# Patient Record
Sex: FEMALE | Race: WHITE | Employment: FULL TIME | URBAN - METROPOLITAN AREA
[De-identification: names, ages, dates, MRNs, and addresses within clinical notes are randomized per-mention and may not be internally consistent; named-entity substitution may affect disease eponyms.]

---

## 2017-01-06 PROBLEM — E55.9 VITAMIN D DEFICIENCY: Status: ACTIVE | Noted: 2017-01-06

## 2017-01-06 PROCEDURE — 80061 LIPID PANEL: CPT | Performed by: FAMILY MEDICINE

## 2017-01-06 PROCEDURE — 85025 COMPLETE CBC W/AUTO DIFF WBC: CPT | Performed by: FAMILY MEDICINE

## 2017-01-06 PROCEDURE — 82306 VITAMIN D 25 HYDROXY: CPT | Performed by: FAMILY MEDICINE

## 2017-01-06 PROCEDURE — 80053 COMPREHEN METABOLIC PANEL: CPT | Performed by: FAMILY MEDICINE

## 2017-01-06 NOTE — PROGRESS NOTES
Razia Connolly is a 39year old female. Patient presents with:  Medication Follow-Up: per pt, following up on medication      HPI:   Better. Sadness has improved. On 40 mg of fluoxetine.  Around the time of her period, she feels worse, but even that has got apparent distress  SKIN: no rashes,no suspicious lesions   HEENT: atraumatic, normocephalic,ears and throat are clear  NECK: supple,no adenopathy   LUNGS: clear to auscultation  CARDIO: RRR without murmur  GI: good BS's,no masses, HSM or tenderness   EXTRE

## 2017-07-18 DIAGNOSIS — F32.89 OTHER DEPRESSION: ICD-10-CM

## 2017-07-18 RX ORDER — FLUOXETINE HYDROCHLORIDE 40 MG/1
CAPSULE ORAL
Qty: 30 CAPSULE | Refills: 2 | Status: SHIPPED | OUTPATIENT
Start: 2017-07-18 | End: 2017-10-15

## 2017-10-02 ENCOUNTER — TELEPHONE (OUTPATIENT)
Dept: OBGYN CLINIC | Facility: CLINIC | Age: 42
End: 2017-10-02

## 2017-10-02 DIAGNOSIS — Z12.31 VISIT FOR SCREENING MAMMOGRAM: Primary | ICD-10-CM

## 2017-10-15 DIAGNOSIS — F32.89 OTHER DEPRESSION: ICD-10-CM

## 2017-10-16 RX ORDER — FLUOXETINE HYDROCHLORIDE 40 MG/1
CAPSULE ORAL
Qty: 30 CAPSULE | Refills: 0 | Status: SHIPPED | OUTPATIENT
Start: 2017-10-16 | End: 2017-11-09

## 2017-10-16 NOTE — TELEPHONE ENCOUNTER
Patient notified via detailed voicemail left at cell number (ok per  HIPAA consent)  Advised patient to call back and schedule an appt.

## 2017-10-16 NOTE — TELEPHONE ENCOUNTER
I had wanted her to follow up in 6 months to make sure fluoxetine is still working well. Can you have her schedule, please so we can check in? Will send in 1 month worth. Thanks.

## 2017-11-09 ENCOUNTER — OFFICE VISIT (OUTPATIENT)
Dept: FAMILY MEDICINE CLINIC | Facility: CLINIC | Age: 42
End: 2017-11-09

## 2017-11-09 VITALS
DIASTOLIC BLOOD PRESSURE: 76 MMHG | WEIGHT: 136 LBS | TEMPERATURE: 98 F | HEIGHT: 66 IN | SYSTOLIC BLOOD PRESSURE: 118 MMHG | RESPIRATION RATE: 14 BRPM | OXYGEN SATURATION: 99 % | BODY MASS INDEX: 21.86 KG/M2 | HEART RATE: 80 BPM

## 2017-11-09 DIAGNOSIS — Z23 NEED FOR VACCINATION: Primary | ICD-10-CM

## 2017-11-09 DIAGNOSIS — F32.89 OTHER DEPRESSION: ICD-10-CM

## 2017-11-09 PROCEDURE — 99213 OFFICE O/P EST LOW 20 MIN: CPT | Performed by: FAMILY MEDICINE

## 2017-11-09 PROCEDURE — 90471 IMMUNIZATION ADMIN: CPT | Performed by: FAMILY MEDICINE

## 2017-11-09 PROCEDURE — 90686 IIV4 VACC NO PRSV 0.5 ML IM: CPT | Performed by: FAMILY MEDICINE

## 2017-11-09 RX ORDER — FLUOXETINE HYDROCHLORIDE 40 MG/1
40 CAPSULE ORAL
Qty: 30 CAPSULE | Refills: 5 | Status: SHIPPED | OUTPATIENT
Start: 2017-11-09 | End: 2018-05-13

## 2017-11-09 NOTE — PROGRESS NOTES
Jenita Alpers is a 39year old female. Patient presents with: Follow - Up: medication check-med refill-Prozac      HPI:   Feeling well. No new concerns. Doing well with fluoxetine. No side effects. Not feeling down or depressed. No anxiety.  Busy with her atraumatic, normocephalic,ears and throat are clear  NECK: supple,no adenopathy   LUNGS: clear to auscultation  CARDIO: RRR without murmur  GI: good BS's,no masses, HSM or tenderness  EXTREMITIES: no cyanosis, clubbing or edema    ASSESSMENT AND PLAN:

## 2017-11-17 ENCOUNTER — HOSPITAL ENCOUNTER (OUTPATIENT)
Dept: MAMMOGRAPHY | Age: 42
Discharge: HOME | End: 2017-11-17
Attending: OBSTETRICS & GYNECOLOGY
Payer: COMMERCIAL

## 2017-11-17 DIAGNOSIS — Z12.31 VISIT FOR SCREENING MAMMOGRAM: ICD-10-CM

## 2017-11-17 PROCEDURE — 77067 SCR MAMMO BI INCL CAD: CPT | Performed by: OBSTETRICS & GYNECOLOGY

## 2017-11-27 ENCOUNTER — HOSPITAL ENCOUNTER (OUTPATIENT)
Dept: MAMMOGRAPHY | Age: 42
Discharge: HOME | End: 2017-11-27
Attending: OBSTETRICS & GYNECOLOGY
Payer: COMMERCIAL

## 2017-11-27 DIAGNOSIS — R92.2 INCONCLUSIVE MAMMOGRAM: ICD-10-CM

## 2017-11-27 PROCEDURE — 77065 DX MAMMO INCL CAD UNI: CPT | Performed by: OBSTETRICS & GYNECOLOGY

## 2017-11-27 PROCEDURE — 77061 BREAST TOMOSYNTHESIS UNI: CPT | Performed by: OBSTETRICS & GYNECOLOGY

## 2017-12-26 ENCOUNTER — TELEPHONE (OUTPATIENT)
Dept: FAMILY MEDICINE CLINIC | Facility: CLINIC | Age: 42
End: 2017-12-26

## 2017-12-26 ENCOUNTER — OFFICE VISIT (OUTPATIENT)
Dept: FAMILY MEDICINE CLINIC | Facility: CLINIC | Age: 42
End: 2017-12-26

## 2017-12-26 VITALS
OXYGEN SATURATION: 100 % | RESPIRATION RATE: 14 BRPM | WEIGHT: 134.19 LBS | BODY MASS INDEX: 22 KG/M2 | SYSTOLIC BLOOD PRESSURE: 116 MMHG | DIASTOLIC BLOOD PRESSURE: 70 MMHG | TEMPERATURE: 98 F | HEART RATE: 106 BPM

## 2017-12-26 DIAGNOSIS — J40 BRONCHITIS: Primary | ICD-10-CM

## 2017-12-26 PROCEDURE — 99214 OFFICE O/P EST MOD 30 MIN: CPT | Performed by: FAMILY MEDICINE

## 2017-12-26 RX ORDER — AMOXICILLIN AND CLAVULANATE POTASSIUM 875; 125 MG/1; MG/1
1 TABLET, FILM COATED ORAL 2 TIMES DAILY
Qty: 20 TABLET | Refills: 0 | Status: SHIPPED | OUTPATIENT
Start: 2017-12-26 | End: 2018-01-05

## 2017-12-26 NOTE — TELEPHONE ENCOUNTER
PT CALLED AND ADV THAT SHE HAS A COUGH AND A HARD TIME BREATHING--PT FEELS LIKE SHE MAY HAVE    BRONCHITIS    PT WOULD LIKE TO BE SEEN TODAY IF POSS OR RECOMMENDATIONS    PHARMACY SHANNA Bliss

## 2017-12-26 NOTE — PROGRESS NOTES
Mj Razo is a 43year old female. Patient presents with:  Cough: x5 days    HPI:   Adama Buenrostro presents to the office with complaints of upper respiratory tract infection, having congestion for 5 days.   She has had a cough and yellow and mucoid sputum produ 12/25/2017 (Exact Date)   SpO2 100%   Breastfeeding?  No   BMI 21.66 kg/m²     EYES: no conjunctivitis, no drainage, EOMI, PERRLA  NOSE: clear, yellow rinorrhea, nose Normal, without visible defects  THROAT: clear, mildly edematous, erythema, tonsils 2+ b/l

## 2017-12-26 NOTE — TELEPHONE ENCOUNTER
Future Appointments  Date Time Provider Nehemiah Sloan   12/26/2017 1:45 PM Javier Burroughs [facility] MIKE Spencer

## 2018-05-13 DIAGNOSIS — F32.89 OTHER DEPRESSION: ICD-10-CM

## 2018-05-14 RX ORDER — FLUOXETINE HYDROCHLORIDE 40 MG/1
CAPSULE ORAL
Qty: 30 CAPSULE | Refills: 0 | Status: SHIPPED | OUTPATIENT
Start: 2018-05-14 | End: 2018-06-13

## 2018-05-14 NOTE — TELEPHONE ENCOUNTER
Last refilled on 11/9/17 for # 30 with 5 rf. Last seen on 12/26/17. No future appointments. Thank you.

## 2018-06-13 DIAGNOSIS — F32.89 OTHER DEPRESSION: ICD-10-CM

## 2018-06-14 RX ORDER — FLUOXETINE HYDROCHLORIDE 40 MG/1
CAPSULE ORAL
Qty: 30 CAPSULE | Refills: 0 | Status: SHIPPED | OUTPATIENT
Start: 2018-06-14 | End: 2018-06-22

## 2018-06-14 NOTE — TELEPHONE ENCOUNTER
Patient notified via detailed voicemail left at cell number (ok per  HIPAA consent) that script was sent to pharmacy but she is due for an appointment before next refill. Advised to call office back to schedule.

## 2018-06-14 NOTE — TELEPHONE ENCOUNTER
Last refilled on 5/14/18 for # 30 with 0 refills  Last seen on 12/26/17  No future appointments. Thank you.

## 2018-06-14 NOTE — TELEPHONE ENCOUNTER
Script sent. I would like to follow up with her on this, since it has been 6 months since I saw her.

## 2018-06-22 NOTE — PROGRESS NOTES
Shilo Edge is a 43year old female. Patient presents with:  Medication Follow-Up: per pt  Mole Removal: talk about moles on face      HPI:   Depression is much better. She is feeling more blunted, like she can't have emotions. No anxiey. Sleeping okay. masses, HSM or tenderness  EXTREMITIES: no cyanosis, clubbing or edema    ASSESSMENT AND PLAN:     Other depression  (primary encounter diagnosis)  Multiple nevi  Skin lesion of face    Diagnoses and all orders for this visit:    Other depression  -     FL

## 2018-08-02 ENCOUNTER — MED REC SCAN ONLY (OUTPATIENT)
Dept: FAMILY MEDICINE CLINIC | Facility: CLINIC | Age: 43
End: 2018-08-02

## 2018-09-14 NOTE — PROGRESS NOTES
Do Chavez is a 43year old female. Patient presents with: Follow - Up: on medications per pt      HPI:   Still feeling a little better, but still blah, doesn't care at times. Her  points it out a times.  Doesn't call people at work back when distress  SKIN: no rashes,no suspicious lesions  HEENT: atraumatic, normocephalic   NECK: supple,no adenopathy   LUNGS: clear to auscultation  CARDIO: RRR without murmur  GI: no tenderness   EXTREMITIES: no edema    ASSESSMENT AND PLAN:     Other depressio

## 2018-11-09 ENCOUNTER — OFFICE VISIT (OUTPATIENT)
Dept: OBGYN CLINIC | Facility: CLINIC | Age: 43
End: 2018-11-09
Payer: COMMERCIAL

## 2018-11-09 VITALS
HEART RATE: 64 BPM | DIASTOLIC BLOOD PRESSURE: 60 MMHG | WEIGHT: 145 LBS | HEIGHT: 66.5 IN | SYSTOLIC BLOOD PRESSURE: 122 MMHG | BODY MASS INDEX: 23.03 KG/M2 | RESPIRATION RATE: 16 BRPM

## 2018-11-09 DIAGNOSIS — Z12.4 CERVICAL CANCER SCREENING: ICD-10-CM

## 2018-11-09 DIAGNOSIS — Z23 NEED FOR VACCINATION: ICD-10-CM

## 2018-11-09 DIAGNOSIS — Z01.419 ENCOUNTER FOR WELL WOMAN EXAM WITH ROUTINE GYNECOLOGICAL EXAM: Primary | ICD-10-CM

## 2018-11-09 PROCEDURE — 90471 IMMUNIZATION ADMIN: CPT | Performed by: OBSTETRICS & GYNECOLOGY

## 2018-11-09 PROCEDURE — 88175 CYTOPATH C/V AUTO FLUID REDO: CPT | Performed by: OBSTETRICS & GYNECOLOGY

## 2018-11-09 PROCEDURE — 90686 IIV4 VACC NO PRSV 0.5 ML IM: CPT | Performed by: OBSTETRICS & GYNECOLOGY

## 2018-11-09 PROCEDURE — 87624 HPV HI-RISK TYP POOLED RSLT: CPT | Performed by: OBSTETRICS & GYNECOLOGY

## 2018-11-09 PROCEDURE — 99396 PREV VISIT EST AGE 40-64: CPT | Performed by: OBSTETRICS & GYNECOLOGY

## 2018-11-09 NOTE — PROGRESS NOTES
Lyle Castaneda is a 43year old female  Patient's last menstrual period was 10/20/2018 (exact date).  Patient presents with:  Wellness Visit: annual  .c/o menses getting heavier, declines any treatment    OBSTETRICS HISTORY:  OB History    Para Te Concern: Not Asked        Weight Concern: Not Asked    Social History Narrative      Not on file      FAMILY HISTORY:  No family history on file.     MEDICATIONS:    Current Outpatient Medications:   •  Sertraline HCl 25 MG Oral Tab, Take 1 tablet (25 mg to motion  Uterus: normal in size, contour, position, mobility, without tenderness  Adnexa: normal without masses or tenderness  Perineum: normal  Anus: no hemorroids     Assessment & Plan:  Diagnoses and all orders for this visit:    Encounter for well woman

## 2018-11-30 ENCOUNTER — HOSPITAL ENCOUNTER (OUTPATIENT)
Dept: MAMMOGRAPHY | Age: 43
Discharge: HOME | End: 2018-11-30
Attending: FAMILY MEDICINE
Payer: COMMERCIAL

## 2018-11-30 DIAGNOSIS — Z12.31 ENCOUNTER FOR SCREENING MAMMOGRAM FOR MALIGNANT NEOPLASM OF BREAST: ICD-10-CM

## 2018-11-30 PROCEDURE — 77067 SCR MAMMO BI INCL CAD: CPT | Performed by: FAMILY MEDICINE

## 2018-11-30 PROCEDURE — 77063 BREAST TOMOSYNTHESIS BI: CPT | Performed by: FAMILY MEDICINE

## 2018-12-21 DIAGNOSIS — F32.89 OTHER DEPRESSION: ICD-10-CM

## 2018-12-21 NOTE — TELEPHONE ENCOUNTER
How is she doing with this medication? Is it helping? This is a low dose so there is room for an increase in dose if needed.

## 2018-12-21 NOTE — TELEPHONE ENCOUNTER
Last refill on Sertraline #30 with 1 refill on 10 30 2016  Last OV on 9 14 2018   No future appointments scheduled

## 2018-12-22 RX ORDER — SERTRALINE HYDROCHLORIDE 25 MG/1
TABLET, FILM COATED ORAL
Qty: 30 TABLET | Refills: 0 | Status: SHIPPED | OUTPATIENT
Start: 2018-12-22 | End: 2019-01-22

## 2018-12-26 NOTE — TELEPHONE ENCOUNTER
Patient states that she is doing \"well\" with the sertraline. Is happy with it. Advised that I would let Dr Delano August know.

## 2019-01-22 DIAGNOSIS — F32.89 OTHER DEPRESSION: ICD-10-CM

## 2019-01-22 RX ORDER — SERTRALINE HYDROCHLORIDE 25 MG/1
TABLET, FILM COATED ORAL
Qty: 90 TABLET | Refills: 1 | Status: SHIPPED | OUTPATIENT
Start: 2019-01-22 | End: 2019-07-10

## 2019-02-25 ENCOUNTER — E-VISIT (OUTPATIENT)
Dept: FAMILY MEDICINE CLINIC | Facility: CLINIC | Age: 44
End: 2019-02-25

## 2019-02-25 DIAGNOSIS — Z02.9 ENCOUNTERS FOR UNSPECIFIED ADMINISTRATIVE PURPOSE: Primary | ICD-10-CM

## 2019-02-25 PROCEDURE — 98969 ONLINE SERVICE BY HC PRO: CPT | Performed by: NURSE PRACTITIONER

## 2019-03-14 ENCOUNTER — HOSPITAL ENCOUNTER (OUTPATIENT)
Age: 44
Discharge: HOME | End: 2019-03-14
Payer: COMMERCIAL

## 2019-03-14 VITALS
HEART RATE: 78 BPM | TEMPERATURE: 98 F | SYSTOLIC BLOOD PRESSURE: 141 MMHG | BODY MASS INDEX: 22 KG/M2 | WEIGHT: 140 LBS | OXYGEN SATURATION: 98 % | RESPIRATION RATE: 16 BRPM | DIASTOLIC BLOOD PRESSURE: 62 MMHG

## 2019-03-14 DIAGNOSIS — J32.9 VIRAL SINUSITIS: Primary | ICD-10-CM

## 2019-03-14 DIAGNOSIS — B97.89 VIRAL SINUSITIS: Primary | ICD-10-CM

## 2019-03-14 PROCEDURE — 99213 OFFICE O/P EST LOW 20 MIN: CPT

## 2019-03-14 PROCEDURE — 99204 OFFICE O/P NEW MOD 45 MIN: CPT

## 2019-03-14 RX ORDER — PSEUDOEPHEDRINE HCL 120 MG/1
120 TABLET, FILM COATED, EXTENDED RELEASE ORAL EVERY 12 HOURS PRN
Qty: 10 TABLET | Refills: 0 | Status: SHIPPED | OUTPATIENT
Start: 2019-03-14 | End: 2019-04-13

## 2019-03-14 RX ORDER — METHYLPREDNISOLONE 4 MG/1
TABLET ORAL
Qty: 1 PACKAGE | Refills: 0 | Status: SHIPPED | OUTPATIENT
Start: 2019-03-14 | End: 2019-11-15 | Stop reason: ALTCHOICE

## 2019-03-14 RX ORDER — FLUTICASONE PROPIONATE 50 MCG
2 SPRAY, SUSPENSION (ML) NASAL DAILY
Qty: 16 G | Refills: 0 | Status: SHIPPED | OUTPATIENT
Start: 2019-03-14 | End: 2019-04-08

## 2019-03-14 NOTE — ED PROVIDER NOTES
Patient Seen in: [address] [facility] - [place], [place]    History   Patient presents with:  Cough/URI    Stated Complaint: Sinus Pressure (x2 days)    HPI    Patient is a pleasant 24-year-old female with a medical history of depression, previous .   Pa cervical point tenderness. No mastoid erythema or tenderness. No occiput erythema or tenderness  Eye examination: EOMs are intact, normal conjunctival  ENT: No injection noted to the bilateral auditory canals; no loss of landmarks.   Audible nasal congest 0    PSEUDOEPHEDRINE HCL  MG Oral Tablet 12 Hr  Take 1 tablet (120 mg total) by mouth every 12 (twelve) hours as needed for congestion.   Qty: 10 tablet Refills: 0

## 2019-04-08 RX ORDER — FLUTICASONE PROPIONATE 50 MCG
SPRAY, SUSPENSION (ML) NASAL
Qty: 16 G | Refills: 0 | Status: SHIPPED | OUTPATIENT
Start: 2019-04-08 | End: 2019-04-22

## 2019-04-08 NOTE — TELEPHONE ENCOUNTER
Last refilled on 3/14/19 for # 16g with 0 refills  Last OV 9/14/18  No future appointments. Thank you.

## 2019-07-10 DIAGNOSIS — F32.89 OTHER DEPRESSION: ICD-10-CM

## 2019-07-10 RX ORDER — SERTRALINE HYDROCHLORIDE 25 MG/1
TABLET, FILM COATED ORAL
Qty: 90 TABLET | Refills: 0 | Status: SHIPPED | OUTPATIENT
Start: 2019-07-10 | End: 2019-09-20

## 2019-07-10 NOTE — TELEPHONE ENCOUNTER
Last refill on Sertraline #90 with 1 refill on 1 22 2019   Last OV on 9 4 2018   No future appointments scheduled,

## 2019-08-13 ENCOUNTER — TELEPHONE (OUTPATIENT)
Dept: FAMILY MEDICINE CLINIC | Facility: CLINIC | Age: 44
End: 2019-08-13

## 2019-08-13 DIAGNOSIS — N30.00 ACUTE CYSTITIS WITHOUT HEMATURIA: Primary | ICD-10-CM

## 2019-08-13 RX ORDER — SULFAMETHOXAZOLE AND TRIMETHOPRIM 800; 160 MG/1; MG/1
1 TABLET ORAL 2 TIMES DAILY
Qty: 10 TABLET | Refills: 0 | Status: SHIPPED | OUTPATIENT
Start: 2019-08-13 | End: 2019-08-18

## 2019-08-13 NOTE — TELEPHONE ENCOUNTER
Patient states she is having pressure, burning and some bleeding with urination. States she knows this is a UTI.   Would like to know if something can be called in or needs to be seen

## 2019-09-20 DIAGNOSIS — F32.89 OTHER DEPRESSION: ICD-10-CM

## 2019-09-21 RX ORDER — SERTRALINE HYDROCHLORIDE 25 MG/1
TABLET, FILM COATED ORAL
Qty: 90 TABLET | Refills: 0 | Status: SHIPPED | OUTPATIENT
Start: 2019-09-21 | End: 2019-11-15

## 2019-09-21 NOTE — TELEPHONE ENCOUNTER
Last refill: 7/10/2019 #90 with 0 refills  Last Visit: 9/14/2018  Next Visit: No future appointments. Forward to Dr. Caroline Rogel please advise on refills. Thanks.

## 2019-11-15 ENCOUNTER — OFFICE VISIT (OUTPATIENT)
Dept: FAMILY MEDICINE CLINIC | Facility: CLINIC | Age: 44
End: 2019-11-15
Payer: COMMERCIAL

## 2019-11-15 VITALS
DIASTOLIC BLOOD PRESSURE: 80 MMHG | SYSTOLIC BLOOD PRESSURE: 114 MMHG | TEMPERATURE: 99 F | RESPIRATION RATE: 16 BRPM | BODY MASS INDEX: 20.49 KG/M2 | WEIGHT: 129 LBS | HEIGHT: 66.5 IN | HEART RATE: 60 BPM

## 2019-11-15 DIAGNOSIS — Z00.00 HEALTHY ADULT ON ROUTINE PHYSICAL EXAMINATION: Primary | ICD-10-CM

## 2019-11-15 DIAGNOSIS — F32.89 OTHER DEPRESSION: ICD-10-CM

## 2019-11-15 PROCEDURE — 85025 COMPLETE CBC W/AUTO DIFF WBC: CPT | Performed by: FAMILY MEDICINE

## 2019-11-15 PROCEDURE — 80061 LIPID PANEL: CPT | Performed by: FAMILY MEDICINE

## 2019-11-15 PROCEDURE — 99396 PREV VISIT EST AGE 40-64: CPT | Performed by: FAMILY MEDICINE

## 2019-11-15 PROCEDURE — 80053 COMPREHEN METABOLIC PANEL: CPT | Performed by: FAMILY MEDICINE

## 2019-11-15 PROCEDURE — 36415 COLL VENOUS BLD VENIPUNCTURE: CPT | Performed by: FAMILY MEDICINE

## 2019-11-15 RX ORDER — SERTRALINE HYDROCHLORIDE 25 MG/1
25 TABLET, FILM COATED ORAL DAILY
Qty: 90 TABLET | Refills: 3 | Status: SHIPPED | OUTPATIENT
Start: 2019-11-15 | End: 2020-07-09

## 2019-11-15 NOTE — PROGRESS NOTES
HPI:   Isadora Damon is a 37year old female who presents for a complete physical exam. Symptoms: following with gyne. . Patient complains of nothing new. Fasting for blood work. Depression: doing well on zoloft, would like to continue that.      Iveth Lira •      • OTHER SURGICAL HISTORY      benign tumor removed form neck in high school      No family history on file.    Social History:   Social History    Tobacco Use      Smoking status: Never Smoker      Smokeless tobacco: Never Used    Alcohol presents for a complete physical exam. Order in for mammogram. Self breast exam explained. Health maintenance, will check fasting Lipids, CMP, and CBC. Pt' s weight is Body mass index is 20.51 kg/m². , recommended low fat diet and aerobic exercise 30 minut

## 2019-11-19 DIAGNOSIS — D64.9 ANEMIA, UNSPECIFIED TYPE: Primary | ICD-10-CM

## 2019-12-13 ENCOUNTER — HOSPITAL ENCOUNTER (OUTPATIENT)
Dept: MAMMOGRAPHY | Age: 44
Discharge: HOME | End: 2019-12-13
Attending: FAMILY MEDICINE
Payer: COMMERCIAL

## 2019-12-13 DIAGNOSIS — Z12.31 ENCOUNTER FOR SCREENING MAMMOGRAM FOR MALIGNANT NEOPLASM OF BREAST: ICD-10-CM

## 2019-12-13 PROCEDURE — 77063 BREAST TOMOSYNTHESIS BI: CPT | Performed by: FAMILY MEDICINE

## 2019-12-13 PROCEDURE — 77067 SCR MAMMO BI INCL CAD: CPT | Performed by: FAMILY MEDICINE

## 2019-12-16 ENCOUNTER — HOSPITAL ENCOUNTER (OUTPATIENT)
Age: 44
Discharge: HOME | End: 2019-12-16
Payer: COMMERCIAL

## 2019-12-16 VITALS
RESPIRATION RATE: 16 BRPM | TEMPERATURE: 100 F | WEIGHT: 128 LBS | OXYGEN SATURATION: 98 % | SYSTOLIC BLOOD PRESSURE: 121 MMHG | DIASTOLIC BLOOD PRESSURE: 83 MMHG | HEART RATE: 91 BPM | BODY MASS INDEX: 20 KG/M2

## 2019-12-16 DIAGNOSIS — J02.0 STREPTOCOCCAL SORE THROAT: Primary | ICD-10-CM

## 2019-12-16 PROCEDURE — 87430 STREP A AG IA: CPT | Performed by: NURSE PRACTITIONER

## 2019-12-16 PROCEDURE — 99214 OFFICE O/P EST MOD 30 MIN: CPT

## 2019-12-16 PROCEDURE — 99213 OFFICE O/P EST LOW 20 MIN: CPT

## 2019-12-16 RX ORDER — AMOXICILLIN 875 MG/1
875 TABLET, COATED ORAL 2 TIMES DAILY
Qty: 20 TABLET | Refills: 0 | Status: SHIPPED | OUTPATIENT
Start: 2019-12-16 | End: 2019-12-26

## 2019-12-16 NOTE — ED PROVIDER NOTES
Patient Seen in: [address] [facility] [place]      History   Patient presents with:  Sore Throat    Stated Complaint: sore throat tired cough cold     17-year-old female presents today with complaints of isolated sore throat that started last night. Physical Exam  Vitals signs and nursing note reviewed. Constitutional:       Appearance: She is well-developed. HENT:      Head: Normocephalic.       Right Ear: Tympanic membrane and ear canal normal.      Left Ear: Tympanic membrane and ear can Oral Tab  Take 1 tablet (875 mg total) by mouth 2 (two) times daily for 10 days.   Qty: 20 tablet Refills: 0

## 2020-01-20 ENCOUNTER — HOSPITAL ENCOUNTER (OUTPATIENT)
Age: 45
Discharge: HOME | End: 2020-01-20
Attending: FAMILY MEDICINE
Payer: COMMERCIAL

## 2020-01-20 VITALS
SYSTOLIC BLOOD PRESSURE: 126 MMHG | HEART RATE: 70 BPM | RESPIRATION RATE: 16 BRPM | TEMPERATURE: 99 F | OXYGEN SATURATION: 99 % | DIASTOLIC BLOOD PRESSURE: 80 MMHG

## 2020-01-20 DIAGNOSIS — J02.0 STREP PHARYNGITIS: Primary | ICD-10-CM

## 2020-01-20 LAB — POCT RAPID STREP: POSITIVE

## 2020-01-20 PROCEDURE — 99213 OFFICE O/P EST LOW 20 MIN: CPT

## 2020-01-20 PROCEDURE — 87430 STREP A AG IA: CPT | Performed by: FAMILY MEDICINE

## 2020-01-20 PROCEDURE — 99214 OFFICE O/P EST MOD 30 MIN: CPT

## 2020-01-20 RX ORDER — ACETAMINOPHEN 500 MG
1000 TABLET ORAL ONCE
Status: COMPLETED | OUTPATIENT
Start: 2020-01-20 | End: 2020-01-20

## 2020-01-20 RX ORDER — IBUPROFEN 200 MG
200 TABLET ORAL EVERY 6 HOURS PRN
COMMUNITY
End: 2020-11-13 | Stop reason: ALTCHOICE

## 2020-01-20 RX ORDER — MELATONIN
1000 DAILY
COMMUNITY

## 2020-01-20 RX ORDER — AMOXICILLIN 875 MG/1
875 TABLET, COATED ORAL 2 TIMES DAILY
Qty: 20 TABLET | Refills: 0 | Status: SHIPPED | OUTPATIENT
Start: 2020-01-20 | End: 2020-01-30

## 2020-01-20 NOTE — ED PROVIDER NOTES
Patient Seen in: [address] [facility] [place]      History   Patient presents with:  Sore Throat    Stated Complaint: sorethroat x 2 days    HPI    26-year-old female presents with complaints of sore throat started yesterday.   Reports pain on swallowi motion, small anterior cervical lymphadenopathy bilaterally  Lungs clear to auscultation bilaterally  Heart S1-S2 heard no murmurs no gallops  Skin shows no rash        ED Course     Labs Reviewed   POCT RAPID STREP - Abnormal; Notable for the following co

## 2020-01-20 NOTE — ED INITIAL ASSESSMENT (HPI)
Pt sts sore throat began yesterday. Denies cough/cold symptoms, HA, fever. Sts had strep last month and symptoms had completely resolved. Children have had strep during the last month.

## 2020-01-31 ENCOUNTER — HOSPITAL ENCOUNTER (OUTPATIENT)
Dept: CT IMAGING | Age: 45
Discharge: HOME | End: 2020-01-31
Attending: FAMILY MEDICINE

## 2020-01-31 DIAGNOSIS — Z13.9 ENCOUNTER FOR SCREENING: ICD-10-CM

## 2020-02-20 ENCOUNTER — TELEPHONE (OUTPATIENT)
Dept: FAMILY MEDICINE CLINIC | Facility: CLINIC | Age: 45
End: 2020-02-20

## 2020-02-20 NOTE — TELEPHONE ENCOUNTER
Letter mailed to patient reminding her she is due for labs.     Lab Frequency Next Occurrence   CBC WITH DIFFERENTIAL WITH PLATELET Once 34/57/0051   FERRITIN Once 02/19/2020

## 2020-06-04 ENCOUNTER — E-VISIT (OUTPATIENT)
Dept: FAMILY MEDICINE CLINIC | Facility: CLINIC | Age: 45
End: 2020-06-04

## 2020-06-04 DIAGNOSIS — R30.0 DYSURIA: Primary | ICD-10-CM

## 2020-06-04 PROCEDURE — 99421 OL DIG E/M SVC 5-10 MIN: CPT | Performed by: PHYSICIAN ASSISTANT

## 2020-06-05 RX ORDER — NITROFURANTOIN 25; 75 MG/1; MG/1
100 CAPSULE ORAL 2 TIMES DAILY
Qty: 14 CAPSULE | Refills: 0 | Status: SHIPPED | OUTPATIENT
Start: 2020-06-05 | End: 2020-06-12

## 2020-06-05 NOTE — PROGRESS NOTES
Cornelia Gutierres is a 40year old female. HPI:   See answers to questions above.      Current Outpatient Medications   Medication Sig Dispense Refill   • Nitrofurantoin Monohyd Macro 100 MG Oral Cap Take 1 capsule (100 mg total) by mouth 2 (two) times daily f

## 2020-07-28 ENCOUNTER — TELEPHONE (OUTPATIENT)
Dept: FAMILY MEDICINE CLINIC | Facility: CLINIC | Age: 45
End: 2020-07-28

## 2020-10-17 ENCOUNTER — HOSPITAL ENCOUNTER (OUTPATIENT)
Age: 45
Discharge: HOME | End: 2020-10-17
Payer: COMMERCIAL

## 2020-10-17 VITALS
OXYGEN SATURATION: 95 % | RESPIRATION RATE: 20 BRPM | SYSTOLIC BLOOD PRESSURE: 147 MMHG | DIASTOLIC BLOOD PRESSURE: 88 MMHG | HEART RATE: 78 BPM | TEMPERATURE: 97 F

## 2020-10-17 DIAGNOSIS — Z20.822 EXPOSURE TO COVID-19 VIRUS: Primary | ICD-10-CM

## 2020-10-17 PROCEDURE — 90471 IMMUNIZATION ADMIN: CPT | Performed by: PHYSICIAN ASSISTANT

## 2020-10-17 PROCEDURE — 90686 IIV4 VACC NO PRSV 0.5 ML IM: CPT | Performed by: PHYSICIAN ASSISTANT

## 2020-10-17 PROCEDURE — 99213 OFFICE O/P EST LOW 20 MIN: CPT | Performed by: PHYSICIAN ASSISTANT

## 2020-10-17 PROCEDURE — U0003 INFECTIOUS AGENT DETECTION BY NUCLEIC ACID (DNA OR RNA); SEVERE ACUTE RESPIRATORY SYNDROME CORONAVIRUS 2 (SARS-COV-2) (CORONAVIRUS DISEASE [COVID-19]), AMPLIFIED PROBE TECHNIQUE, MAKING USE OF HIGH THROUGHPUT TECHNOLOGIES AS DESCRIBED BY CMS-2020-01-R: HCPCS | Performed by: PHYSICIAN ASSISTANT

## 2020-10-17 NOTE — ED PROVIDER NOTES
Patient Seen in: [facility] [address] [place] [address]      History   Patient presents with:  Testing    Stated Complaint: been exposed- Covid test    HPI    CHIEF COMPLAINT: COVID-19 exposure    HISTORY OF PRESENT ILLNESS: Patient is a pleasant 59-year-old female prese 147/88   Pulse 78   Resp 20   Temp 97 °F (36.1 °C)   Temp src Temporal   SpO2 95 %   O2 Device None (Room air)       Current:/88   Pulse 78   Temp 97 °F (36.1 °C) (Temporal)   Resp 20   LMP 09/16/2020   SpO2 95%         Physical Exam    Nursing notes [facility] [id number]      As needed          Medications Prescribed:  Current Discharge Medication List

## 2020-10-19 DIAGNOSIS — F32.89 OTHER DEPRESSION: ICD-10-CM

## 2020-10-19 NOTE — TELEPHONE ENCOUNTER
Pt had to cancel her visit due to  being +Covid.  She needs a refill on the following medication:    Sertraline HCl 50 MG Oral Tab      [id number] - [place], [place] - [address] AT [place] [address] [address], [phone], [phone]

## 2020-11-13 PROCEDURE — 80061 LIPID PANEL: CPT | Performed by: FAMILY MEDICINE

## 2020-11-13 PROCEDURE — 85025 COMPLETE CBC W/AUTO DIFF WBC: CPT | Performed by: FAMILY MEDICINE

## 2020-11-13 PROCEDURE — 82728 ASSAY OF FERRITIN: CPT | Performed by: FAMILY MEDICINE

## 2020-11-13 PROCEDURE — 36415 COLL VENOUS BLD VENIPUNCTURE: CPT | Performed by: FAMILY MEDICINE

## 2020-11-13 PROCEDURE — 80053 COMPREHEN METABOLIC PANEL: CPT | Performed by: FAMILY MEDICINE

## 2020-11-13 NOTE — PROGRESS NOTES
Taryn Santos is a 40year old female. Patient presents with: Follow - Up: med refills      HPI:   Has had a lot of stress this summer. Doing well with the increased dose of med. Sertraline 50 mg daily. No side effects.  Feels like she is doing well overa kg/m².      GENERAL: well developed, well nourished,in no apparent distress  SKIN: no rashes,no suspicious lesions  HEENT: atraumatic, normocephalic,   NECK: supple,no adenopathy   LUNGS: clear to auscultation  CARDIO: RRR without murmur  GI: good BS's,no plan.

## 2020-11-19 DIAGNOSIS — E78.00 ELEVATED CHOLESTEROL: Primary | ICD-10-CM

## 2021-02-06 ENCOUNTER — HOSPITAL ENCOUNTER (OUTPATIENT)
Dept: MAMMOGRAPHY | Age: 46
Discharge: HOME | End: 2021-02-06
Attending: FAMILY MEDICINE
Payer: COMMERCIAL

## 2021-02-06 DIAGNOSIS — Z12.31 VISIT FOR SCREENING MAMMOGRAM: ICD-10-CM

## 2021-02-06 PROCEDURE — 77063 BREAST TOMOSYNTHESIS BI: CPT | Performed by: FAMILY MEDICINE

## 2021-02-06 PROCEDURE — 77067 SCR MAMMO BI INCL CAD: CPT | Performed by: FAMILY MEDICINE

## 2021-02-19 ENCOUNTER — TELEPHONE (OUTPATIENT)
Dept: FAMILY MEDICINE CLINIC | Facility: CLINIC | Age: 46
End: 2021-02-19

## 2021-02-19 NOTE — TELEPHONE ENCOUNTER
Letter mailed to patient reminding her she is due for labs.     Lab Frequency Next Occurrence   LIPID PANEL Once 02/19/2021

## 2021-03-22 ENCOUNTER — TELEPHONE (OUTPATIENT)
Dept: FAMILY MEDICINE CLINIC | Facility: CLINIC | Age: 46
End: 2021-03-22

## 2021-03-22 NOTE — TELEPHONE ENCOUNTER
Overdue result letter mailed to patient   Lab Frequency Next Occurrence   LIPID PANEL Once 02/19/2021

## 2021-04-09 ENCOUNTER — OFFICE VISIT (OUTPATIENT)
Dept: FAMILY MEDICINE CLINIC | Facility: CLINIC | Age: 46
End: 2021-04-09
Payer: COMMERCIAL

## 2021-04-09 VITALS
OXYGEN SATURATION: 98 % | SYSTOLIC BLOOD PRESSURE: 120 MMHG | WEIGHT: 140 LBS | RESPIRATION RATE: 16 BRPM | HEART RATE: 69 BPM | TEMPERATURE: 98 F | BODY MASS INDEX: 21.97 KG/M2 | HEIGHT: 67 IN | DIASTOLIC BLOOD PRESSURE: 80 MMHG

## 2021-04-09 DIAGNOSIS — F32.89 OTHER DEPRESSION: ICD-10-CM

## 2021-04-09 DIAGNOSIS — D64.9 ANEMIA, UNSPECIFIED TYPE: Primary | ICD-10-CM

## 2021-04-09 DIAGNOSIS — E78.00 ELEVATED CHOLESTEROL: ICD-10-CM

## 2021-04-09 PROCEDURE — 3074F SYST BP LT 130 MM HG: CPT | Performed by: FAMILY MEDICINE

## 2021-04-09 PROCEDURE — 99214 OFFICE O/P EST MOD 30 MIN: CPT | Performed by: FAMILY MEDICINE

## 2021-04-09 PROCEDURE — 3079F DIAST BP 80-89 MM HG: CPT | Performed by: FAMILY MEDICINE

## 2021-04-09 PROCEDURE — 80061 LIPID PANEL: CPT | Performed by: FAMILY MEDICINE

## 2021-04-09 PROCEDURE — 82728 ASSAY OF FERRITIN: CPT | Performed by: FAMILY MEDICINE

## 2021-04-09 PROCEDURE — 3008F BODY MASS INDEX DOCD: CPT | Performed by: FAMILY MEDICINE

## 2021-04-09 RX ORDER — PNV NO.95/FERROUS FUM/FOLIC AC 28MG-0.8MG
TABLET ORAL
COMMUNITY

## 2021-04-09 NOTE — PROGRESS NOTES
Vira Bean is a 39year old female. Patient presents with: Follow - Up: due for labs      HPI:   Added an iron supplement. Would like iron checked. Depression: feeling off lately. On sertraline 50 mg daily.  The past few weeks she feels it isn't wo SpO2 98%   BMI 21.93 kg/m²  Body mass index is 21.93 kg/m².       GENERAL: well developed, well nourished,in no apparent distress  SKIN: no rashes,no suspicious lesions  HEENT: atraumatic, normocephalic,   NECK: supple,no adenopathy   LUNGS: clear to auscul

## 2021-07-02 ENCOUNTER — OFFICE VISIT (OUTPATIENT)
Dept: FAMILY MEDICINE CLINIC | Facility: CLINIC | Age: 46
End: 2021-07-02
Payer: COMMERCIAL

## 2021-07-02 VITALS
OXYGEN SATURATION: 99 % | BODY MASS INDEX: 21.82 KG/M2 | WEIGHT: 139 LBS | SYSTOLIC BLOOD PRESSURE: 140 MMHG | DIASTOLIC BLOOD PRESSURE: 82 MMHG | HEART RATE: 79 BPM | RESPIRATION RATE: 16 BRPM | HEIGHT: 67 IN | TEMPERATURE: 98 F

## 2021-07-02 DIAGNOSIS — G43.709 CHRONIC MIGRAINE WITHOUT AURA WITHOUT STATUS MIGRAINOSUS, NOT INTRACTABLE: Primary | ICD-10-CM

## 2021-07-02 DIAGNOSIS — F41.0 PANIC ATTACK: ICD-10-CM

## 2021-07-02 PROCEDURE — 3079F DIAST BP 80-89 MM HG: CPT | Performed by: FAMILY MEDICINE

## 2021-07-02 PROCEDURE — 99214 OFFICE O/P EST MOD 30 MIN: CPT | Performed by: FAMILY MEDICINE

## 2021-07-02 PROCEDURE — 3077F SYST BP >= 140 MM HG: CPT | Performed by: FAMILY MEDICINE

## 2021-07-02 PROCEDURE — 3008F BODY MASS INDEX DOCD: CPT | Performed by: FAMILY MEDICINE

## 2021-07-02 RX ORDER — ELETRIPTAN HYDROBROMIDE 20 MG/1
20 TABLET, FILM COATED ORAL AS NEEDED
Qty: 8 TABLET | Refills: 0 | Status: SHIPPED | OUTPATIENT
Start: 2021-07-02 | End: 2021-08-24

## 2021-07-02 RX ORDER — HYDROXYZINE HYDROCHLORIDE 25 MG/1
25 TABLET, FILM COATED ORAL 3 TIMES DAILY PRN
Qty: 10 TABLET | Refills: 0 | Status: SHIPPED | OUTPATIENT
Start: 2021-07-02

## 2021-07-02 NOTE — PROGRESS NOTES
Irma Singh is a 39year old female. Patient presents with:  Migraine      HPI:   Has had headaches for 20 yrs or more. This past week Tuesday night had a sudden severe pain on the right side of her head that she had not had before.      Then, Started Diagnosis Date   • Depression    • Hx gestational diabetes    • Pap smear for cervical cancer screening 10/16,07/14,06/13    negative      Social History:  Social History    Tobacco Use      Smoking status: Never Smoker      Smokeless tobacco: Never Used total) by mouth as needed for Migraine. May repeat dose once after 2 hours if needed. Do not exceed 2 doses in 24 hours. - trial of another triptan to see if that is better tolerated than imitrex. Panic attack  -     hydrOXYzine HCl 25 MG Oral Tab;  Ta

## 2021-07-08 DIAGNOSIS — F32.89 OTHER DEPRESSION: ICD-10-CM

## 2021-08-24 DIAGNOSIS — G43.709 CHRONIC MIGRAINE WITHOUT AURA WITHOUT STATUS MIGRAINOSUS, NOT INTRACTABLE: ICD-10-CM

## 2021-08-24 RX ORDER — ELETRIPTAN HYDROBROMIDE 20 MG/1
20 TABLET, FILM COATED ORAL AS NEEDED
Qty: 8 TABLET | Refills: 2 | Status: SHIPPED | OUTPATIENT
Start: 2021-08-24 | End: 2021-12-17

## 2021-08-25 ENCOUNTER — TELEPHONE (OUTPATIENT)
Dept: FAMILY MEDICINE CLINIC | Facility: CLINIC | Age: 46
End: 2021-08-25

## 2021-08-25 NOTE — TELEPHONE ENCOUNTER
Derek from [facility] called requesting a call back from nurse.     RE:  Drug interaction    Please call back # [phone]

## 2021-08-25 NOTE — TELEPHONE ENCOUNTER
RN spoke with Pharmacy    There is a drug interaction with the sertraline and the Eletriptan- can cause serotonin syndrome    Routing to provider to advise

## 2021-08-25 NOTE — TELEPHONE ENCOUNTER
She is not on high dose sertraline and not taking eletriptan regularly. I think it's okay to take. Thanks.

## 2021-10-04 DIAGNOSIS — F32.89 OTHER DEPRESSION: ICD-10-CM

## 2021-12-17 DIAGNOSIS — G43.709 CHRONIC MIGRAINE WITHOUT AURA WITHOUT STATUS MIGRAINOSUS, NOT INTRACTABLE: ICD-10-CM

## 2021-12-17 RX ORDER — ELETRIPTAN HYDROBROMIDE 20 MG/1
20 TABLET, FILM COATED ORAL AS NEEDED
Qty: 8 TABLET | Refills: 2 | Status: SHIPPED | OUTPATIENT
Start: 2021-12-17

## 2021-12-17 NOTE — TELEPHONE ENCOUNTER
Routing to provider per protocol. Eletriptan Hydrobromide 20 MG Oral Tab  Last refilled on 8/24/21 for #8  with 2 rf. Last labs 4/9/21. Last seen on 7/2/21.      Future Appointments   Date Time Provider Nehemiah Sloan   1/21/2022 11:30 AM Shi

## 2022-01-21 ENCOUNTER — OFFICE VISIT (OUTPATIENT)
Dept: OBGYN CLINIC | Facility: CLINIC | Age: 47
End: 2022-01-21
Payer: COMMERCIAL

## 2022-01-21 VITALS
SYSTOLIC BLOOD PRESSURE: 110 MMHG | HEART RATE: 69 BPM | DIASTOLIC BLOOD PRESSURE: 78 MMHG | WEIGHT: 145 LBS | HEIGHT: 67 IN | BODY MASS INDEX: 22.76 KG/M2

## 2022-01-21 DIAGNOSIS — Z01.419 ENCOUNTER FOR WELL WOMAN EXAM WITH ROUTINE GYNECOLOGICAL EXAM: Primary | ICD-10-CM

## 2022-01-21 DIAGNOSIS — Z12.4 CERVICAL CANCER SCREENING: ICD-10-CM

## 2022-01-21 DIAGNOSIS — Z12.31 BREAST CANCER SCREENING BY MAMMOGRAM: ICD-10-CM

## 2022-01-21 PROCEDURE — 99396 PREV VISIT EST AGE 40-64: CPT | Performed by: OBSTETRICS & GYNECOLOGY

## 2022-01-21 PROCEDURE — 88175 CYTOPATH C/V AUTO FLUID REDO: CPT | Performed by: OBSTETRICS & GYNECOLOGY

## 2022-01-21 PROCEDURE — 3008F BODY MASS INDEX DOCD: CPT | Performed by: OBSTETRICS & GYNECOLOGY

## 2022-01-21 PROCEDURE — 3078F DIAST BP <80 MM HG: CPT | Performed by: OBSTETRICS & GYNECOLOGY

## 2022-01-21 PROCEDURE — 3074F SYST BP LT 130 MM HG: CPT | Performed by: OBSTETRICS & GYNECOLOGY

## 2022-01-21 PROCEDURE — 87624 HPV HI-RISK TYP POOLED RSLT: CPT | Performed by: OBSTETRICS & GYNECOLOGY

## 2022-01-21 NOTE — PROGRESS NOTES
Reginald Lema is a 55year old female [id number] Patient's last menstrual period was 01/13/2022 (exact date). Patient presents with:  Wellness Visit  . Patient has no complaints, menses are on the heavier side, but manageable     OBSTETRICS HISTORY:  OB Histor Not Asked        Weight Concern: Not Asked    Social History Narrative      Not on file    Social Determinants of Health  Financial Resource Strain: Not on file  Food Insecurity: Not on file  Transportation Needs: Not on file  Physical Activity: Not on sondra nourished  Head/Face: normocephalic  Neck/Thyroid: thyroid symmetric, no thyromegaly, no nodules, no adenopathy  Lymphatic:no abnormal supraclavicular or axillary adenopathy is noted  Breast: normal without palpable masses, tenderness, asymmetry, nipple di

## 2022-01-24 LAB — HPV I/H RISK 1 DNA SPEC QL NAA+PROBE: NEGATIVE

## 2022-02-01 ENCOUNTER — TELEPHONE (OUTPATIENT)
Dept: OBGYN CLINIC | Facility: CLINIC | Age: 47
End: 2022-02-01

## 2022-02-01 NOTE — PROGRESS NOTES
Left message to call office back for test results/recommendations.    (regarding advise to proceed with EMB to evaluate presence of endometrial cells on pap smear)

## 2022-02-25 ENCOUNTER — HOSPITAL ENCOUNTER (OUTPATIENT)
Dept: MAMMOGRAPHY | Age: 47
Discharge: HOME | End: 2022-02-25
Attending: FAMILY MEDICINE
Payer: COMMERCIAL

## 2022-02-25 DIAGNOSIS — Z12.31 ENCOUNTER FOR SCREENING MAMMOGRAM FOR MALIGNANT NEOPLASM OF BREAST: ICD-10-CM

## 2022-02-25 DIAGNOSIS — Z12.31 BREAST CANCER SCREENING BY MAMMOGRAM: ICD-10-CM

## 2022-02-25 PROCEDURE — 77067 SCR MAMMO BI INCL CAD: CPT | Performed by: OBSTETRICS & GYNECOLOGY

## 2022-02-25 PROCEDURE — 77063 BREAST TOMOSYNTHESIS BI: CPT | Performed by: OBSTETRICS & GYNECOLOGY

## 2022-03-03 NOTE — TELEPHONE ENCOUNTER
Received fax from [facility] regarding Rx refill request    LOV 07/02/2021  Last refill on 10/05/2021, for #90 tabs, with 1 refills  sertraline 50 MG Oral Tab    Future Appointments   Date Time Provider Nehemiah Sloan   3/10/2022  4:15 PM Ashleigh Osullivan, DO [facility]   1/27/2023 11:00 AM Natalie Bennett, DO [facility] Chayo Malone) pending, please review. Thank you.

## 2022-04-26 ENCOUNTER — TELEPHONE (OUTPATIENT)
Dept: OBGYN CLINIC | Facility: CLINIC | Age: 47
End: 2022-04-26

## 2022-04-26 NOTE — TELEPHONE ENCOUNTER
Pt c/o heavy, irregular monthly menses. She had her period for 2 weeks, now for 4d, passing some lime size clots x10 in total. Denies dizziness, SOB. Last H/H WNL. Sched. For EMB 4/28, may need to cancel if period still heavy. Will route for advisement, will call pt back. Pt. Verb understanding.

## 2022-04-28 ENCOUNTER — OFFICE VISIT (OUTPATIENT)
Dept: OBGYN CLINIC | Facility: CLINIC | Age: 47
End: 2022-04-28
Payer: COMMERCIAL

## 2022-04-28 VITALS
BODY MASS INDEX: 21.53 KG/M2 | HEART RATE: 91 BPM | HEIGHT: 67 IN | WEIGHT: 137.19 LBS | DIASTOLIC BLOOD PRESSURE: 74 MMHG | SYSTOLIC BLOOD PRESSURE: 118 MMHG

## 2022-04-28 DIAGNOSIS — N92.6 IRREGULAR MENSES: ICD-10-CM

## 2022-04-28 DIAGNOSIS — N93.9 ABNORMAL UTERINE BLEEDING (AUB): Primary | ICD-10-CM

## 2022-04-28 LAB
CONTROL LINE PRESENT WITH A CLEAR BACKGROUND (YES/NO): YES YES/NO
PREGNANCY TEST, URINE: NEGATIVE

## 2022-04-28 PROCEDURE — 3074F SYST BP LT 130 MM HG: CPT | Performed by: OBSTETRICS & GYNECOLOGY

## 2022-04-28 PROCEDURE — 58100 BIOPSY OF UTERUS LINING: CPT | Performed by: OBSTETRICS & GYNECOLOGY

## 2022-04-28 PROCEDURE — 99213 OFFICE O/P EST LOW 20 MIN: CPT | Performed by: OBSTETRICS & GYNECOLOGY

## 2022-04-28 PROCEDURE — 3078F DIAST BP <80 MM HG: CPT | Performed by: OBSTETRICS & GYNECOLOGY

## 2022-04-28 PROCEDURE — 3008F BODY MASS INDEX DOCD: CPT | Performed by: OBSTETRICS & GYNECOLOGY

## 2022-04-28 PROCEDURE — 81025 URINE PREGNANCY TEST: CPT | Performed by: OBSTETRICS & GYNECOLOGY

## 2022-04-28 PROCEDURE — 88305 TISSUE EXAM BY PATHOLOGIST: CPT | Performed by: OBSTETRICS & GYNECOLOGY

## 2022-04-29 ENCOUNTER — ULTRASOUND ENCOUNTER (OUTPATIENT)
Dept: OBGYN CLINIC | Facility: CLINIC | Age: 47
End: 2022-04-29
Payer: COMMERCIAL

## 2022-05-02 ENCOUNTER — PATIENT MESSAGE (OUTPATIENT)
Dept: OBGYN CLINIC | Facility: CLINIC | Age: 47
End: 2022-05-02

## 2022-05-03 ENCOUNTER — TELEPHONE (OUTPATIENT)
Dept: OBGYN CLINIC | Facility: CLINIC | Age: 47
End: 2022-05-03

## 2022-05-03 RX ORDER — MEDROXYPROGESTERONE ACETATE 10 MG/1
10 TABLET ORAL 2 TIMES DAILY
Qty: 20 TABLET | Refills: 0 | Status: SHIPPED | OUTPATIENT
Start: 2022-05-03

## 2022-05-24 ENCOUNTER — PATIENT MESSAGE (OUTPATIENT)
Dept: OBGYN CLINIC | Facility: CLINIC | Age: 47
End: 2022-05-24

## 2022-05-24 NOTE — TELEPHONE ENCOUNTER
From: Isabel Bethea  To: Ashley Schultz DO  Sent: 5/24/2022 12:47 PM CDT  Subject: Provera follow-up    I took the Provera as prescribed and had much less clotting than prior months. The period was still heavy and had more cramping than usual, but there is no spotting now. Do I need to do anything further or take any medication moving forward? Thank you.

## 2022-06-20 DIAGNOSIS — G43.709 CHRONIC MIGRAINE WITHOUT AURA WITHOUT STATUS MIGRAINOSUS, NOT INTRACTABLE: ICD-10-CM

## 2022-06-20 RX ORDER — ELETRIPTAN HYDROBROMIDE 20 MG/1
20 TABLET, FILM COATED ORAL AS NEEDED
Qty: 8 TABLET | Refills: 2 | Status: SHIPPED | OUTPATIENT
Start: 2022-06-20

## 2023-01-06 DIAGNOSIS — G43.709 CHRONIC MIGRAINE WITHOUT AURA WITHOUT STATUS MIGRAINOSUS, NOT INTRACTABLE: ICD-10-CM

## 2023-01-06 RX ORDER — ELETRIPTAN HYDROBROMIDE 20 MG/1
TABLET, FILM COATED ORAL
Qty: 8 TABLET | Refills: 0 | Status: SHIPPED | OUTPATIENT
Start: 2023-01-06

## 2023-01-12 NOTE — TELEPHONE ENCOUNTER
Left detailed message to voicemail (per verbal release form consent with confirmed identifying message) of Doctor's note below. Patient advised to call office back and schedule appt.

## 2023-01-27 ENCOUNTER — OFFICE VISIT (OUTPATIENT)
Facility: CLINIC | Age: 48
End: 2023-01-27
Payer: COMMERCIAL

## 2023-01-27 VITALS
BODY MASS INDEX: 23.23 KG/M2 | WEIGHT: 148 LBS | HEART RATE: 76 BPM | HEIGHT: 67 IN | DIASTOLIC BLOOD PRESSURE: 60 MMHG | SYSTOLIC BLOOD PRESSURE: 108 MMHG

## 2023-01-27 DIAGNOSIS — Z01.419 ENCOUNTER FOR WELL WOMAN EXAM WITH ROUTINE GYNECOLOGICAL EXAM: Primary | ICD-10-CM

## 2023-01-27 PROCEDURE — 3008F BODY MASS INDEX DOCD: CPT | Performed by: OBSTETRICS & GYNECOLOGY

## 2023-01-27 PROCEDURE — 3074F SYST BP LT 130 MM HG: CPT | Performed by: OBSTETRICS & GYNECOLOGY

## 2023-01-27 PROCEDURE — 3078F DIAST BP <80 MM HG: CPT | Performed by: OBSTETRICS & GYNECOLOGY

## 2023-01-27 PROCEDURE — 99396 PREV VISIT EST AGE 40-64: CPT | Performed by: OBSTETRICS & GYNECOLOGY

## 2023-01-27 RX ORDER — NORETHINDRONE ACETATE AND ETHINYL ESTRADIOL 1MG-20(21)
1 KIT ORAL DAILY
Qty: 84 TABLET | Refills: 3 | Status: SHIPPED | OUTPATIENT
Start: 2023-01-27 | End: 2024-01-27

## 2023-02-10 ENCOUNTER — OFFICE VISIT (OUTPATIENT)
Dept: FAMILY MEDICINE CLINIC | Facility: CLINIC | Age: 48
End: 2023-02-10
Payer: COMMERCIAL

## 2023-02-10 VITALS
HEIGHT: 66 IN | WEIGHT: 147 LBS | BODY MASS INDEX: 23.63 KG/M2 | OXYGEN SATURATION: 99 % | HEART RATE: 80 BPM | TEMPERATURE: 97 F | SYSTOLIC BLOOD PRESSURE: 118 MMHG | DIASTOLIC BLOOD PRESSURE: 76 MMHG

## 2023-02-10 DIAGNOSIS — G43.709 CHRONIC MIGRAINE WITHOUT AURA WITHOUT STATUS MIGRAINOSUS, NOT INTRACTABLE: ICD-10-CM

## 2023-02-10 DIAGNOSIS — D64.9 ANEMIA, UNSPECIFIED TYPE: ICD-10-CM

## 2023-02-10 DIAGNOSIS — F41.0 PANIC ATTACK: ICD-10-CM

## 2023-02-10 DIAGNOSIS — Z12.31 ENCOUNTER FOR SCREENING MAMMOGRAM FOR MALIGNANT NEOPLASM OF BREAST: ICD-10-CM

## 2023-02-10 DIAGNOSIS — Z00.00 HEALTHY ADULT ON ROUTINE PHYSICAL EXAMINATION: Primary | ICD-10-CM

## 2023-02-10 DIAGNOSIS — Z23 NEED FOR VACCINATION: ICD-10-CM

## 2023-02-10 DIAGNOSIS — F41.9 ANXIETY AND DEPRESSION: ICD-10-CM

## 2023-02-10 DIAGNOSIS — F32.A ANXIETY AND DEPRESSION: ICD-10-CM

## 2023-02-10 LAB
ALBUMIN SERPL-MCNC: 4.1 G/DL (ref 3.4–5)
ALBUMIN/GLOB SERPL: 1.2 {RATIO} (ref 1–2)
ALP LIVER SERPL-CCNC: 59 U/L
ALT SERPL-CCNC: 23 U/L
ANION GAP SERPL CALC-SCNC: 6 MMOL/L (ref 0–18)
AST SERPL-CCNC: 17 U/L (ref 15–37)
BASOPHILS # BLD AUTO: 0.06 X10(3) UL (ref 0–0.2)
BASOPHILS NFR BLD AUTO: 1 %
BILIRUB SERPL-MCNC: 0.4 MG/DL (ref 0.1–2)
BUN BLD-MCNC: 10 MG/DL (ref 7–18)
CALCIUM BLD-MCNC: 9.6 MG/DL (ref 8.5–10.1)
CHLORIDE SERPL-SCNC: 109 MMOL/L (ref 98–112)
CHOLEST SERPL-MCNC: 231 MG/DL (ref ?–200)
CO2 SERPL-SCNC: 25 MMOL/L (ref 21–32)
CREAT BLD-MCNC: 0.71 MG/DL
EOSINOPHIL # BLD AUTO: 0.2 X10(3) UL (ref 0–0.7)
EOSINOPHIL NFR BLD AUTO: 3.4 %
ERYTHROCYTE [DISTWIDTH] IN BLOOD BY AUTOMATED COUNT: 12.4 %
FASTING PATIENT LIPID ANSWER: YES
FASTING STATUS PATIENT QL REPORTED: YES
GFR SERPLBLD BASED ON 1.73 SQ M-ARVRAT: 105 ML/MIN/1.73M2 (ref 60–?)
GLOBULIN PLAS-MCNC: 3.5 G/DL (ref 2.8–4.4)
GLUCOSE BLD-MCNC: 92 MG/DL (ref 70–99)
HCT VFR BLD AUTO: 35.8 %
HDLC SERPL-MCNC: 53 MG/DL (ref 40–59)
HGB BLD-MCNC: 11.9 G/DL
IMM GRANULOCYTES # BLD AUTO: 0.03 X10(3) UL (ref 0–1)
IMM GRANULOCYTES NFR BLD: 0.5 %
LDLC SERPL CALC-MCNC: 148 MG/DL (ref ?–100)
LYMPHOCYTES # BLD AUTO: 1.42 X10(3) UL (ref 1–4)
LYMPHOCYTES NFR BLD AUTO: 24.3 %
MCH RBC QN AUTO: 29.4 PG (ref 26–34)
MCHC RBC AUTO-ENTMCNC: 33.2 G/DL (ref 31–37)
MCV RBC AUTO: 88.4 FL
MONOCYTES # BLD AUTO: 0.41 X10(3) UL (ref 0.1–1)
MONOCYTES NFR BLD AUTO: 7 %
NEUTROPHILS # BLD AUTO: 3.72 X10 (3) UL (ref 1.5–7.7)
NEUTROPHILS # BLD AUTO: 3.72 X10(3) UL (ref 1.5–7.7)
NEUTROPHILS NFR BLD AUTO: 63.8 %
NONHDLC SERPL-MCNC: 178 MG/DL (ref ?–130)
OSMOLALITY SERPL CALC.SUM OF ELEC: 289 MOSM/KG (ref 275–295)
PLATELET # BLD AUTO: 232 10(3)UL (ref 150–450)
POTASSIUM SERPL-SCNC: 4.3 MMOL/L (ref 3.5–5.1)
PROT SERPL-MCNC: 7.6 G/DL (ref 6.4–8.2)
RBC # BLD AUTO: 4.05 X10(6)UL
SODIUM SERPL-SCNC: 140 MMOL/L (ref 136–145)
TRIGL SERPL-MCNC: 169 MG/DL (ref 30–149)
VLDLC SERPL CALC-MCNC: 32 MG/DL (ref 0–30)
WBC # BLD AUTO: 5.8 X10(3) UL (ref 4–11)

## 2023-02-10 PROCEDURE — 85025 COMPLETE CBC W/AUTO DIFF WBC: CPT | Performed by: FAMILY MEDICINE

## 2023-02-10 PROCEDURE — 3008F BODY MASS INDEX DOCD: CPT | Performed by: FAMILY MEDICINE

## 2023-02-10 PROCEDURE — 83540 ASSAY OF IRON: CPT | Performed by: FAMILY MEDICINE

## 2023-02-10 PROCEDURE — 80053 COMPREHEN METABOLIC PANEL: CPT | Performed by: FAMILY MEDICINE

## 2023-02-10 PROCEDURE — 3078F DIAST BP <80 MM HG: CPT | Performed by: FAMILY MEDICINE

## 2023-02-10 PROCEDURE — 3074F SYST BP LT 130 MM HG: CPT | Performed by: FAMILY MEDICINE

## 2023-02-10 PROCEDURE — 80061 LIPID PANEL: CPT | Performed by: FAMILY MEDICINE

## 2023-02-10 PROCEDURE — 90471 IMMUNIZATION ADMIN: CPT | Performed by: FAMILY MEDICINE

## 2023-02-10 PROCEDURE — 90715 TDAP VACCINE 7 YRS/> IM: CPT | Performed by: FAMILY MEDICINE

## 2023-02-10 PROCEDURE — 83550 IRON BINDING TEST: CPT | Performed by: FAMILY MEDICINE

## 2023-02-10 PROCEDURE — 99396 PREV VISIT EST AGE 40-64: CPT | Performed by: FAMILY MEDICINE

## 2023-02-10 PROCEDURE — 82728 ASSAY OF FERRITIN: CPT | Performed by: FAMILY MEDICINE

## 2023-02-10 RX ORDER — ELETRIPTAN HYDROBROMIDE 20 MG/1
TABLET, FILM COATED ORAL
Qty: 8 TABLET | Refills: 5 | Status: SHIPPED | OUTPATIENT
Start: 2023-02-10

## 2023-02-13 DIAGNOSIS — D64.9 ANEMIA, UNSPECIFIED TYPE: Primary | ICD-10-CM

## 2023-02-13 LAB
DEPRECATED HBV CORE AB SER IA-ACNC: 19.9 NG/ML
IRON SATN MFR SERPL: 9 %
IRON SERPL-MCNC: 44 UG/DL
TIBC SERPL-MCNC: 471 UG/DL (ref 240–450)
TRANSFERRIN SERPL-MCNC: 316 MG/DL (ref 200–360)

## 2023-03-03 ENCOUNTER — HOSPITAL ENCOUNTER (OUTPATIENT)
Dept: MAMMOGRAPHY | Age: 48
Discharge: HOME | End: 2023-03-03
Attending: FAMILY MEDICINE
Payer: COMMERCIAL

## 2023-03-03 DIAGNOSIS — Z12.31 ENCOUNTER FOR SCREENING MAMMOGRAM FOR MALIGNANT NEOPLASM OF BREAST: ICD-10-CM

## 2023-03-03 PROCEDURE — 77063 BREAST TOMOSYNTHESIS BI: CPT | Performed by: FAMILY MEDICINE

## 2023-03-03 PROCEDURE — 77067 SCR MAMMO BI INCL CAD: CPT | Performed by: FAMILY MEDICINE

## 2023-03-22 ENCOUNTER — APPOINTMENT (OUTPATIENT)
Dept: GENERAL RADIOLOGY | Age: 48
End: 2023-03-22
Attending: NURSE PRACTITIONER
Payer: COMMERCIAL

## 2023-03-22 ENCOUNTER — HOSPITAL ENCOUNTER (OUTPATIENT)
Age: 48
Discharge: HOME | End: 2023-03-22
Payer: COMMERCIAL

## 2023-03-22 VITALS
DIASTOLIC BLOOD PRESSURE: 87 MMHG | TEMPERATURE: 99 F | WEIGHT: 145 LBS | SYSTOLIC BLOOD PRESSURE: 144 MMHG | OXYGEN SATURATION: 98 % | RESPIRATION RATE: 18 BRPM | HEART RATE: 79 BPM | BODY MASS INDEX: 23.3 KG/M2 | HEIGHT: 66 IN

## 2023-03-22 DIAGNOSIS — R05.1 ACUTE COUGH: Primary | ICD-10-CM

## 2023-03-22 DIAGNOSIS — R05.9 COUGH: ICD-10-CM

## 2023-03-22 DIAGNOSIS — J18.9 COMMUNITY ACQUIRED PNEUMONIA, UNSPECIFIED LATERALITY: ICD-10-CM

## 2023-03-22 PROCEDURE — 99204 OFFICE O/P NEW MOD 45 MIN: CPT | Performed by: NURSE PRACTITIONER

## 2023-03-22 PROCEDURE — 71046 X-RAY EXAM CHEST 2 VIEWS: CPT | Performed by: NURSE PRACTITIONER

## 2023-03-22 RX ORDER — PREDNISONE 20 MG/1
20 TABLET ORAL 2 TIMES DAILY
Qty: 10 TABLET | Refills: 0 | Status: SHIPPED | OUTPATIENT
Start: 2023-03-22 | End: 2023-03-27

## 2023-03-22 RX ORDER — AZITHROMYCIN 250 MG/1
TABLET, FILM COATED ORAL
Qty: 6 TABLET | Refills: 0 | Status: SHIPPED | OUTPATIENT
Start: 2023-03-22 | End: 2023-03-27

## 2023-03-22 RX ORDER — AMOXICILLIN 500 MG/1
1000 TABLET, FILM COATED ORAL 3 TIMES DAILY
Qty: 30 TABLET | Refills: 0 | Status: SHIPPED | OUTPATIENT
Start: 2023-03-22 | End: 2023-03-27

## 2023-03-22 RX ORDER — ALBUTEROL SULFATE 90 UG/1
2 AEROSOL, METERED RESPIRATORY (INHALATION) EVERY 4 HOURS PRN
Qty: 1 EACH | Refills: 0 | Status: SHIPPED | OUTPATIENT
Start: 2023-03-22 | End: 2023-04-21

## 2023-03-22 RX ORDER — BENZONATATE 100 MG/1
100 CAPSULE ORAL 3 TIMES DAILY PRN
Qty: 30 CAPSULE | Refills: 0 | Status: SHIPPED | OUTPATIENT
Start: 2023-03-22 | End: 2023-04-21

## 2023-03-22 NOTE — DISCHARGE INSTRUCTIONS
Follow-up with your primary care physician in one week if symptoms have not improved or symptoms are starting to get worse. Increase fluids, keep well-hydrated. Take Tylenol and Motrin for fever and pain. Finish the full course of the antibiotics for 5 days  Use the steroids twice a day for 5 days  Use the Tessalon Perles for the cough  Use the inhaler as needed return to the emergency room from symptoms or concerns.

## 2023-03-22 NOTE — ED INITIAL ASSESSMENT (HPI)
Pt with c/o of productive cough x 2.5 weeks. States initially cough was very congested and productive and now her cough is still productive but harder for her to get secretions out. She feels SOB with talking or exertion.    Denies fever

## 2023-04-05 ENCOUNTER — PATIENT MESSAGE (OUTPATIENT)
Dept: FAMILY MEDICINE CLINIC | Facility: CLINIC | Age: 48
End: 2023-04-05

## 2023-04-05 NOTE — TELEPHONE ENCOUNTER
From: Misa Munoz  To: Jerrell Knox DO  Sent: 4/5/2023 10:02 AM CDT  Subject: Follow-up    Good morning. I was seen at [facility] in [facility] on 3/22/23 and diagnosed with pneumonia and I think a partially collapsed lung. I completed my prescribed antibiotics and steroids but still feel tightness in my chest area and some wheezing. Do I just wait for this to run its course, or do I need to schedule a follow-up appt. ?  Thank you.

## 2023-04-05 NOTE — TELEPHONE ENCOUNTER
[facility] visit 03/22/23 - for cough, URI; CXR done;  Rx abx, cough med, steroids, inhaler    Called and spoke with pt - pt reports she did feel better, but is now feeling like \"going opposite direction\"  Pt not short of breath over phone    Pt looking for recommendations - follow-up visit appt made    Future Appointments   Date Time Provider Nehemiah Sloan   4/6/2023  2:00 PM Chilo Hinds DO [facility] Con Ken pt if worsening symptoms - please go to ER/IC for evaluation - she v/u, no further questions at this time    Routing to PCP as Romelia Negron

## 2023-04-06 ENCOUNTER — OFFICE VISIT (OUTPATIENT)
Dept: FAMILY MEDICINE CLINIC | Facility: CLINIC | Age: 48
End: 2023-04-06
Payer: COMMERCIAL

## 2023-04-06 VITALS
SYSTOLIC BLOOD PRESSURE: 128 MMHG | WEIGHT: 146 LBS | HEIGHT: 66 IN | RESPIRATION RATE: 16 BRPM | TEMPERATURE: 98 F | OXYGEN SATURATION: 100 % | DIASTOLIC BLOOD PRESSURE: 80 MMHG | HEART RATE: 77 BPM | BODY MASS INDEX: 23.46 KG/M2

## 2023-04-06 DIAGNOSIS — J40 BRONCHITIS: Primary | ICD-10-CM

## 2023-04-06 PROCEDURE — 99214 OFFICE O/P EST MOD 30 MIN: CPT | Performed by: FAMILY MEDICINE

## 2023-04-06 PROCEDURE — 3008F BODY MASS INDEX DOCD: CPT | Performed by: FAMILY MEDICINE

## 2023-04-06 PROCEDURE — 3079F DIAST BP 80-89 MM HG: CPT | Performed by: FAMILY MEDICINE

## 2023-04-06 PROCEDURE — 3074F SYST BP LT 130 MM HG: CPT | Performed by: FAMILY MEDICINE

## 2023-04-06 RX ORDER — FLUTICASONE PROPIONATE 110 UG/1
2 AEROSOL, METERED RESPIRATORY (INHALATION) 2 TIMES DAILY
Qty: 1 EACH | Refills: 0 | Status: SHIPPED | OUTPATIENT
Start: 2023-04-06 | End: 2023-05-06

## 2023-05-19 ENCOUNTER — TELEPHONE (OUTPATIENT)
Dept: FAMILY MEDICINE CLINIC | Facility: CLINIC | Age: 48
End: 2023-05-19

## 2023-05-19 DIAGNOSIS — E61.1 LOW IRON: Primary | ICD-10-CM

## 2023-05-19 NOTE — TELEPHONE ENCOUNTER
Letter mailed to patient reminding her she is due for labs per pt reminder. Koffi Johnson RN  Janice Scruggs Nurse  Recall cbc and iron/tibc in 3 months.      Lab Frequency Next Occurrence   CBC WITH DIFFERENTIAL WITH PLATELET Once 40/18/6856   IRON AND TIBC Once 05/19/2023

## 2023-08-11 ENCOUNTER — TELEPHONE (OUTPATIENT)
Dept: FAMILY MEDICINE CLINIC | Facility: CLINIC | Age: 48
End: 2023-08-11

## 2023-09-01 ENCOUNTER — OFFICE VISIT (OUTPATIENT)
Dept: OBGYN CLINIC | Facility: CLINIC | Age: 48
End: 2023-09-01
Payer: COMMERCIAL

## 2023-09-01 VITALS
HEIGHT: 66 IN | DIASTOLIC BLOOD PRESSURE: 93 MMHG | SYSTOLIC BLOOD PRESSURE: 158 MMHG | WEIGHT: 143.31 LBS | HEART RATE: 91 BPM | BODY MASS INDEX: 23.03 KG/M2

## 2023-09-01 DIAGNOSIS — N92.1 MENORRHAGIA WITH IRREGULAR CYCLE: ICD-10-CM

## 2023-09-01 DIAGNOSIS — N92.6 IRREGULAR MENSES: Primary | ICD-10-CM

## 2023-09-25 DIAGNOSIS — N92.1 MENORRHAGIA WITH IRREGULAR CYCLE: ICD-10-CM

## 2023-10-20 ENCOUNTER — OFFICE VISIT (OUTPATIENT)
Dept: OBGYN CLINIC | Facility: CLINIC | Age: 48
End: 2023-10-20
Payer: COMMERCIAL

## 2023-10-20 VITALS
BODY MASS INDEX: 23.74 KG/M2 | WEIGHT: 147.69 LBS | DIASTOLIC BLOOD PRESSURE: 100 MMHG | HEART RATE: 77 BPM | SYSTOLIC BLOOD PRESSURE: 152 MMHG | HEIGHT: 66 IN

## 2023-10-20 DIAGNOSIS — Z23 NEED FOR VACCINATION: ICD-10-CM

## 2023-10-20 DIAGNOSIS — N92.1 MENORRHAGIA WITH IRREGULAR CYCLE: Primary | ICD-10-CM

## 2023-10-20 NOTE — PROGRESS NOTES
Juhi Cheung is a 52year old female  Patient's last menstrual period was 2023 (exact date). Patient presents with:  Contraception  Menstrual Problem: Periods are still heavy, doesn't think OCPs are working   Other: Patient said YES to student in the room   . OBSTETRICS HISTORY:  OB History    Para Term  AB Living   2 2 1         SAB IAB Ectopic Multiple Live Births         1        # Outcome Date GA Lbr Nick/2nd Weight Sex Delivery Anes PTL Lv   2A Term 2011 37w0d   M CS-Unspec      2B Term 2011 37w0d   M CS-Unspec      1 Para 2009    M CS-Unspec          GYNE HISTORY:  Periods irregular, moderate flow     Sexual activity:   Yes      Partners:   Male      Birth control/ protection:   Vasectomy      Comment:                       MEDICAL HISTORY:  Past Medical History:   Diagnosis Date    Depression     Hx gestational diabetes     Pap smear for cervical cancer screening 10/16,,    negative       SURGICAL HISTORY:  Past Surgical History:   Procedure Laterality Date          Other surgical history      benign tumor removed form neck in high school       SOCIAL HISTORY:  Social History    Socioeconomic History      Marital status:       Spouse name: Not on file      Number of children: Not on file      Years of education: Not on file      Highest education level: Not on file    Occupational History      Not on file    Tobacco Use      Smoking status: Never      Smokeless tobacco: Never    Vaping Use      Vaping Use: Never used    Substance and Sexual Activity      Alcohol use:  Yes        Alcohol/week: 0.0 standard drinks of alcohol        Comment: occ      Drug use: No      Sexual activity: Yes        Partners: Male        Birth control/protection: Vasectomy        Comment: 2012    Other Topics      Concerns:        Caffeine Concern: Not Asked        Exercise: Not Asked        Seat Belt: Not Asked        Special Diet: Not Asked        Stress Concern: Not Asked        Weight Concern: Not Asked    Social History Narrative      Not on file    Social Determinants of Health  Financial Resource Strain: Not on file  Food Insecurity: Not on file  Transportation Needs: Not on file  Physical Activity: Not on file  Stress: Not on file  Social Connections: Not on file  Housing Stability: Not on file    FAMILY HISTORY:  Family History   Problem Relation Age of Onset    No Known Problems Father     No Known Problems Mother     Other (perthes disease) Son     No Known Problems Son     No Known Problems Son     No Known Problems Maternal Grandmother     Heart Disease Maternal Grandfather     No Known Problems Paternal Grandmother     Heart Disease Paternal Grandfather     Breast Cancer Neg     Prostate Cancer Neg     Ovarian Cancer Neg     Colon Cancer Neg     Pancreatic Cancer Neg     Uterine Cancer Neg     Infertility Neg     Endometriosis Neg        MEDICATIONS:    Current Outpatient Medications:     norethindrone 5 MG Oral Tab, Take 0.5 tablets (2.5 mg total) by mouth nightly., Disp: 30 tablet, Rfl: 3    Eletriptan Hydrobromide 20 MG Oral Tab, TAKE 1 TABLET BY MOUTH AS NEEDED FOR MIGRAINE. MAY REPEAT DOSE AFTER 2 HOURS IF NEEDED. DO NOT EXCEED 2 DOSES IN 24 HOURS., Disp: 8 tablet, Rfl: 5    hydrOXYzine HCl 25 MG Oral Tab, Take 1 tablet (25 mg total) by mouth 3 (three) times daily as needed for Anxiety. , Disp: 10 tablet, Rfl: 0    Ferrous Sulfate (IRON) 325 (65 Fe) MG Oral Tab, Take by mouth., Disp: , Rfl:     Vitamin D3 25 MCG (1000 UT) Oral Tab, Take 1 tablet (1,000 Units total) by mouth daily. , Disp: , Rfl:     ALLERGIES:  No Known Allergies        Assessment & Plan:  1. Need for vaccination    - INFLUENZA VACCINE, QUAD, PRESERVATIVE FREE, 0.5 ML    2. Menorrhagia with irregular cycle    - norethindrone 5 MG Oral Tab; Take 0.5 tablets (2.5 mg total) by mouth nightly. Dispense: 30 tablet; Refill: 3    -Discussed with patient to continue the norethindrone 2.5 mg nightly. She reports that her bleeding has improved. Discussed with patient to take 600 mg Motrin every 6 hours one to two days before her menses to help control her menstrual pain.

## 2023-12-21 ENCOUNTER — APPOINTMENT (OUTPATIENT)
Dept: GENERAL RADIOLOGY | Age: 48
End: 2023-12-21
Attending: NURSE PRACTITIONER
Payer: COMMERCIAL

## 2023-12-21 ENCOUNTER — HOSPITAL ENCOUNTER (OUTPATIENT)
Age: 48
Discharge: HOME | End: 2023-12-21
Payer: COMMERCIAL

## 2023-12-21 VITALS
WEIGHT: 150 LBS | TEMPERATURE: 98 F | HEART RATE: 96 BPM | DIASTOLIC BLOOD PRESSURE: 95 MMHG | BODY MASS INDEX: 24.11 KG/M2 | HEIGHT: 66 IN | SYSTOLIC BLOOD PRESSURE: 154 MMHG | RESPIRATION RATE: 18 BRPM | OXYGEN SATURATION: 100 %

## 2023-12-21 DIAGNOSIS — R05.1 ACUTE COUGH: ICD-10-CM

## 2023-12-21 DIAGNOSIS — J20.9 ACUTE BRONCHITIS, UNSPECIFIED ORGANISM: Primary | ICD-10-CM

## 2023-12-21 LAB — SARS-COV-2 RNA RESP QL NAA+PROBE: NOT DETECTED

## 2023-12-21 PROCEDURE — 94640 AIRWAY INHALATION TREATMENT: CPT | Performed by: NURSE PRACTITIONER

## 2023-12-21 PROCEDURE — 71046 X-RAY EXAM CHEST 2 VIEWS: CPT | Performed by: NURSE PRACTITIONER

## 2023-12-21 PROCEDURE — U0002 COVID-19 LAB TEST NON-CDC: HCPCS | Performed by: NURSE PRACTITIONER

## 2023-12-21 PROCEDURE — 99213 OFFICE O/P EST LOW 20 MIN: CPT | Performed by: NURSE PRACTITIONER

## 2023-12-21 RX ORDER — ALBUTEROL SULFATE 90 UG/1
2 AEROSOL, METERED RESPIRATORY (INHALATION) EVERY 4 HOURS PRN
Qty: 1 EACH | Refills: 0 | Status: SHIPPED | OUTPATIENT
Start: 2023-12-21 | End: 2024-01-20

## 2023-12-21 RX ORDER — VILAZODONE HYDROCHLORIDE 20 MG/1
TABLET ORAL
COMMUNITY

## 2023-12-21 RX ORDER — ALBUTEROL SULFATE 2.5 MG/3ML
2.5 SOLUTION RESPIRATORY (INHALATION) ONCE
Status: COMPLETED | OUTPATIENT
Start: 2023-12-21 | End: 2023-12-21

## 2023-12-21 RX ORDER — ALBUTEROL SULFATE 90 UG/1
2 AEROSOL, METERED RESPIRATORY (INHALATION) EVERY 4 HOURS PRN
Qty: 1 EACH | Refills: 0 | Status: SHIPPED | OUTPATIENT
Start: 2023-12-21 | End: 2023-12-21

## 2023-12-21 RX ORDER — IPRATROPIUM BROMIDE AND ALBUTEROL SULFATE 2.5; .5 MG/3ML; MG/3ML
3 SOLUTION RESPIRATORY (INHALATION) ONCE
Status: COMPLETED | OUTPATIENT
Start: 2023-12-21 | End: 2023-12-21

## 2023-12-21 RX ORDER — PREDNISONE 20 MG/1
40 TABLET ORAL DAILY
Qty: 10 TABLET | Refills: 0 | Status: SHIPPED | OUTPATIENT
Start: 2023-12-21 | End: 2023-12-26

## 2023-12-21 RX ORDER — BENZONATATE 100 MG/1
100 CAPSULE ORAL 3 TIMES DAILY PRN
Qty: 30 CAPSULE | Refills: 0 | Status: SHIPPED | OUTPATIENT
Start: 2023-12-21 | End: 2023-12-21

## 2023-12-21 RX ORDER — BENZONATATE 100 MG/1
100 CAPSULE ORAL 3 TIMES DAILY PRN
Qty: 30 CAPSULE | Refills: 0 | Status: SHIPPED | OUTPATIENT
Start: 2023-12-21 | End: 2024-01-20

## 2023-12-21 RX ORDER — PREDNISONE 20 MG/1
40 TABLET ORAL DAILY
Qty: 10 TABLET | Refills: 0 | Status: SHIPPED | OUTPATIENT
Start: 2023-12-21 | End: 2023-12-21

## 2023-12-22 NOTE — DISCHARGE INSTRUCTIONS
Your COVID test was negative today. Use the albuterol inhaler 2 puffs every 4 hours as needed for tight cough or chest tightness. Try to use it at least 4 times a day for the next 4 days to keep your airways open. Take the steroid as prescribed take this with food to avoid an upset stomach. May take over-the-counter DayQuil and NyQuil as you have been and also the [employer] as prescribed for the cough. Thank you for choosing our [facility] for your medical condition today. Please be sure to follow up with your primary care provider in 2 days if no improvement, or as directed. If any worsening of your symptoms or other concerns call your primary care provider, return here or go to the emergency department.

## 2024-02-02 ENCOUNTER — OFFICE VISIT (OUTPATIENT)
Facility: CLINIC | Age: 49
End: 2024-02-02
Payer: COMMERCIAL

## 2024-02-02 VITALS
SYSTOLIC BLOOD PRESSURE: 115 MMHG | WEIGHT: 159.81 LBS | DIASTOLIC BLOOD PRESSURE: 81 MMHG | HEART RATE: 71 BPM | BODY MASS INDEX: 25.68 KG/M2 | HEIGHT: 66 IN

## 2024-02-02 DIAGNOSIS — Z12.31 BREAST CANCER SCREENING BY MAMMOGRAM: ICD-10-CM

## 2024-02-02 DIAGNOSIS — Z12.4 CERVICAL CANCER SCREENING: ICD-10-CM

## 2024-02-02 DIAGNOSIS — Z01.419 ENCOUNTER FOR WELL WOMAN EXAM WITH ROUTINE GYNECOLOGICAL EXAM: Primary | ICD-10-CM

## 2024-02-02 DIAGNOSIS — N92.1 MENORRHAGIA WITH IRREGULAR CYCLE: ICD-10-CM

## 2024-02-02 PROCEDURE — 87624 HPV HI-RISK TYP POOLED RSLT: CPT | Performed by: OBSTETRICS & GYNECOLOGY

## 2024-02-02 PROCEDURE — 88175 CYTOPATH C/V AUTO FLUID REDO: CPT | Performed by: OBSTETRICS & GYNECOLOGY

## 2024-02-02 PROCEDURE — 99396 PREV VISIT EST AGE 40-64: CPT | Performed by: OBSTETRICS & GYNECOLOGY

## 2024-02-02 NOTE — PROGRESS NOTES
Claribel Mohan is a 48 year old female  Patient's last menstrual period was 2023 (approximate).   Chief Complaint   Patient presents with    Wellness Visit     Annual Exam     Other     Patient said YES to student    .  Patient has no complaints, no menses on norethindrone   OBSTETRICS HISTORY:  OB History    Para Term  AB Living   2 2 1         SAB IAB Ectopic Multiple Live Births         1        # Outcome Date GA Lbr Nick/2nd Weight Sex Delivery Anes PTL Lv   2A Term 2011 37w0d   M CS-Unspec      2B Term 2011 37w0d   M CS-Unspec      1 Para 2009    M CS-Unspec          GYNE HISTORY:  Periods absent    History   Sexual Activity    Sexual activity: Yes    Partners: Male    Birth control/ protection: Vasectomy     Comment:         Hx Prior Abnormal Pap: No  Pap Date: 22  Pap Result Notes: Negative        MEDICAL HISTORY:  Past Medical History:   Diagnosis Date    Anxiety     Depression     Dysmenorrhea     Dyspareunia     Fibroids     Hx gestational diabetes     Pap smear for cervical cancer screening 10/16,,    negative       SURGICAL HISTORY:  Past Surgical History:   Procedure Laterality Date          Other surgical history      benign tumor removed form neck in high school       SOCIAL HISTORY:  Social History     Socioeconomic History    Marital status:      Spouse name: Not on file    Number of children: Not on file    Years of education: Not on file    Highest education level: Not on file   Occupational History    Not on file   Tobacco Use    Smoking status: Never    Smokeless tobacco: Never   Vaping Use    Vaping Use: Never used   Substance and Sexual Activity    Alcohol use: Yes     Comment: occ    Drug use: No    Sexual activity: Yes     Partners: Male     Birth control/protection: Vasectomy     Comment:    Other Topics Concern    Caffeine Concern No    Exercise No    Seat Belt Yes    Special Diet No    Stress Concern Yes    Weight  Concern Yes   Social History Narrative    Not on file     Social Determinants of Health     Financial Resource Strain: Not on file   Food Insecurity: Not on file   Transportation Needs: Not on file   Physical Activity: Not on file   Stress: Not on file   Social Connections: Not on file   Housing Stability: Not on file       FAMILY HISTORY:  Family History   Problem Relation Age of Onset    Diabetes Father     Heart Disorder Mother         Mitral valve prolapse    Other (perthes disease) Son     No Known Problems Son     No Known Problems Son     No Known Problems Maternal Grandmother     Heart Disease Maternal Grandfather     Heart Disorder Maternal Grandfather          of heart attack.    No Known Problems Paternal Grandmother     Heart Disease Paternal Grandfather     Heart Disorder Paternal Grandfather          of congenital heart disease.    Breast Cancer Neg     Prostate Cancer Neg     Ovarian Cancer Neg     Colon Cancer Neg     Pancreatic Cancer Neg     Uterine Cancer Neg     Infertility Neg     Endometriosis Neg        MEDICATIONS:    Current Outpatient Medications:     norethindrone 5 MG Oral Tab, Take 0.5 tablets (2.5 mg total) by mouth nightly., Disp: 30 tablet, Rfl: 5    vilazodone 20 MG Oral Tab, , Disp: , Rfl:     Eletriptan Hydrobromide 20 MG Oral Tab, TAKE 1 TABLET BY MOUTH AS NEEDED FOR MIGRAINE. MAY REPEAT DOSE AFTER 2 HOURS IF NEEDED. DO NOT EXCEED 2 DOSES IN 24 HOURS., Disp: 8 tablet, Rfl: 5    hydrOXYzine HCl 25 MG Oral Tab, Take 1 tablet (25 mg total) by mouth 3 (three) times daily as needed for Anxiety., Disp: 10 tablet, Rfl: 0    Ferrous Sulfate (IRON) 325 (65 Fe) MG Oral Tab, Take by mouth., Disp: , Rfl:     Vitamin D3 25 MCG (1000 UT) Oral Tab, Take 1 tablet (1,000 Units total) by mouth daily., Disp: , Rfl:     ALLERGIES:  No Known Allergies      Review of Systems:  Constitutional:  Denies fatigue, night sweats, hot flashes  Eyes:  denies blurred or double vision  Cardiovascular:   denies chest pain or palpitations  Respiratory:  denies shortness of breath  Gastrointestinal:  denies heartburn, abdominal pain, diarrhea or constipation  Genitourinary:  denies dysuria, incontinence, abnormal vaginal discharge, vaginal itching  Musculoskeletal:  denies back pain.  Skin/Breast:  Denies any breast pain, lumps, or discharge.   Neurological:  denies headaches, extremity weakness or numbness.  Psychiatric: denies depression or anxiety.  Endocrine:   denies excessive thirst or urination.  Heme/Lymph:  denies history of anemia, easy bruising or bleeding.      PHYSICAL EXAM:   Constitutional: well developed, well nourished  Head/Face: normocephalic  Neck/Thyroid: thyroid symmetric, no thyromegaly, no nodules, no adenopathy  Lymphatic:no abnormal supraclavicular or axillary adenopathy is noted  Breast: normal without palpable masses, tenderness, asymmetry, nipple discharge, nipple retraction or skin changes  Abdomen:  soft, nontender, nondistended, no masses  Skin/Hair: no unusual rashes or bruises  Extremities: no edema, no cyanosis  Psychiatric:  Oriented to time, place, person and situation. Appropriate mood and affect    Pelvic Exam:  External Genitalia: normal appearance, hair distribution, and no lesions  Urethral Meatus:  normal in size, location, without lesions and prolapse  Bladder:  No fullness, masses or tenderness  Vagina:  Normal appearance without lesions, no abnormal discharge  Cervix:  Normal without tenderness on motion  Uterus: normal in size, contour, position, mobility, without tenderness  Adnexa: normal without masses or tenderness  Perineum: normal  Anus: no hemorroids     Assessment & Plan:  Diagnoses and all orders for this visit:    Encounter for well woman exam with routine gynecological exam    Cervical cancer screening  -     Hpv High Risk , Thin Prep Collect; Future  -     ThinPrep PAP Smear B; Future    Breast cancer screening by mammogram  -     [facility] ANABELLE 2D+3D SCREENING  RUBEN (CPT=77067/01443); Future    Menorrhagia with irregular cycle  -     norethindrone 5 MG Oral Tab; Take 0.5 tablets (2.5 mg total) by mouth nightly.

## 2024-02-05 LAB — HPV I/H RISK 1 DNA SPEC QL NAA+PROBE: NEGATIVE

## 2024-02-09 LAB
.: NORMAL
.: NORMAL

## 2024-03-08 ENCOUNTER — HOSPITAL ENCOUNTER (OUTPATIENT)
Dept: MAMMOGRAPHY | Age: 49
Discharge: HOME | End: 2024-03-08
Attending: OBSTETRICS & GYNECOLOGY
Payer: COMMERCIAL

## 2024-03-08 DIAGNOSIS — Z12.31 BREAST CANCER SCREENING BY MAMMOGRAM: ICD-10-CM

## 2024-03-08 PROCEDURE — 77063 BREAST TOMOSYNTHESIS BI: CPT | Performed by: OBSTETRICS & GYNECOLOGY

## 2024-03-08 PROCEDURE — 77067 SCR MAMMO BI INCL CAD: CPT | Performed by: OBSTETRICS & GYNECOLOGY

## 2024-05-28 DIAGNOSIS — G43.709 CHRONIC MIGRAINE WITHOUT AURA WITHOUT STATUS MIGRAINOSUS, NOT INTRACTABLE: ICD-10-CM

## 2024-05-28 RX ORDER — ELETRIPTAN HYDROBROMIDE 20 MG/1
TABLET, FILM COATED ORAL
Qty: 8 TABLET | Refills: 5 | Status: SHIPPED | OUTPATIENT
Start: 2024-05-28

## 2024-06-06 ENCOUNTER — OFFICE VISIT (OUTPATIENT)
Dept: FAMILY MEDICINE CLINIC | Facility: CLINIC | Age: 49
End: 2024-06-06
Payer: COMMERCIAL

## 2024-06-06 VITALS
WEIGHT: 162 LBS | RESPIRATION RATE: 18 BRPM | SYSTOLIC BLOOD PRESSURE: 140 MMHG | TEMPERATURE: 97 F | BODY MASS INDEX: 26.03 KG/M2 | DIASTOLIC BLOOD PRESSURE: 100 MMHG | HEIGHT: 66 IN | HEART RATE: 83 BPM | OXYGEN SATURATION: 97 %

## 2024-06-06 DIAGNOSIS — E78.00 ELEVATED CHOLESTEROL: ICD-10-CM

## 2024-06-06 DIAGNOSIS — Z00.00 HEALTHY ADULT ON ROUTINE PHYSICAL EXAMINATION: Primary | ICD-10-CM

## 2024-06-06 DIAGNOSIS — R73.03 PRE-DIABETES: ICD-10-CM

## 2024-06-06 DIAGNOSIS — I10 PRIMARY HYPERTENSION: ICD-10-CM

## 2024-06-06 DIAGNOSIS — Z12.11 SCREENING FOR MALIGNANT NEOPLASM OF COLON: ICD-10-CM

## 2024-06-06 PROCEDURE — 99396 PREV VISIT EST AGE 40-64: CPT | Performed by: FAMILY MEDICINE

## 2024-06-06 RX ORDER — LOSARTAN POTASSIUM 25 MG/1
25 TABLET ORAL DAILY
Qty: 30 TABLET | Refills: 0 | Status: SHIPPED | OUTPATIENT
Start: 2024-06-06

## 2024-06-06 RX ORDER — DROSPIRENONE AND ETHINYL ESTRADIOL 0.03MG-3MG
KIT ORAL
COMMUNITY
Start: 2024-06-02

## 2024-06-06 NOTE — PROGRESS NOTES
HPI:   Claribel Mohan is a 48 year old female who presents for a complete physical exam. Symptoms: denies discharge, itching, burning or dysuria, periods were bad, so gyne put her continuous OCP . Has not bled since Oct/Nov 2023. Recently started seeing a menopausal specialist in [place].  They did labs and her cholesterol was high, LDL: 170's.  A1c was 6.1.     BP: Has been high the past few doctor visits. More headaches lately. Parents both have HTN.     Was on meds for moods, velazodone, hydroxyzine. Stopped a month or so ago. She didn't feel well. Counseling wasn't helping. She thinks it's hormone related. Hoping the new hormone med she started this week will help.     PHQ-2 SCORE: 0  , done 2/2/2024   Last [place] Suicide Screening on 6/6/2024 was No Risk.        Immunization History   Administered Date(s) Administered    Covid-19 Vaccine Pfizer 30 mcg/0.3 ml 04/05/2021, 04/26/2021, 12/02/2021    FLULAVAL 6 months & older 0.5 ml Prefilled syringe (66402) 11/09/2017, 10/17/2020    FLUZONE 6 months and older PFS 0.5 ml (28406) 11/09/2018, 10/20/2023    Influenza 10/16/2019, 11/09/2022    TDAP 02/10/2023     Wt Readings from Last 6 Encounters:   06/06/24 162 lb (73.5 kg)   02/02/24 159 lb 12.8 oz (72.5 kg)   12/21/23 150 lb (68 kg)   10/20/23 147 lb 11.3 oz (67 kg)   09/01/23 143 lb 4.8 oz (65 kg)   04/06/23 146 lb (66.2 kg)     Body mass index is 26.15 kg/m².     Lab Results   Component Value Date    GLU 92 02/10/2023    GLU 94 11/13/2020    GLU 91 11/15/2019     Lab Results   Component Value Date    CHOLEST 231 (H) 02/10/2023    CHOLEST 222 (H) 04/09/2021    CHOLEST 255 (H) 11/13/2020     Lab Results   Component Value Date    HDL 53 02/10/2023    HDL 48 04/09/2021    HDL 56 11/13/2020     Lab Results   Component Value Date     (H) 02/10/2023     (H) 04/09/2021     (H) 11/13/2020     Lab Results   Component Value Date    AST 17 02/10/2023    AST 9 (L) 11/13/2020    AST 10 (L) 11/15/2019      Lab Results   Component Value Date    ALT 23 02/10/2023    ALT 17 2020    ALT 19 11/15/2019       Current Outpatient Medications   Medication Sig Dispense Refill    FROYLAN 3-0.03 MG Oral Tab       losartan 25 MG Oral Tab Take 1 tablet (25 mg total) by mouth daily. 30 tablet 0    Eletriptan Hydrobromide 20 MG Oral Tab TAKE 1 TABLET BY MOUTH AS NEEDED FOR MIGRAINE. MAY REPEAT DOSE AFTER 2 HOURS IF NEEDED. DO NOT EXCEED 2 DOSES IN 24 HOURS. 8 tablet 5    Ferrous Sulfate (IRON) 325 (65 Fe) MG Oral Tab Take by mouth.      Vitamin D3 25 MCG (1000 UT) Oral Tab Take 1 tablet (1,000 Units total) by mouth daily.        Past Medical History:    Anxiety    Depression    Dysmenorrhea    Dyspareunia    Fibroids    Hx gestational diabetes    Pap smear for cervical cancer screening    negative      Past Surgical History:   Procedure Laterality Date          Other surgical history      benign tumor removed form neck in high school      Family History   Problem Relation Age of Onset    Diabetes Father     Heart Disorder Mother         Mitral valve prolapse    Other (perthes disease) Son     No Known Problems Son     No Known Problems Son     No Known Problems Maternal Grandmother     Heart Disease Maternal Grandfather     Heart Disorder Maternal Grandfather          of heart attack.    No Known Problems Paternal Grandmother     Heart Disease Paternal Grandfather     Heart Disorder Paternal Grandfather          of congenital heart disease.    Breast Cancer Neg     Prostate Cancer Neg     Ovarian Cancer Neg     Colon Cancer Neg     Pancreatic Cancer Neg     Uterine Cancer Neg     Infertility Neg     Endometriosis Neg       Social History:   Social History     Socioeconomic History    Marital status:    Tobacco Use    Smoking status: Never    Smokeless tobacco: Never   Vaping Use    Vaping status: Never Used   Substance and Sexual Activity    Alcohol use: Yes     Comment: occ    Drug use: No    Sexual  activity: Yes     Partners: Male     Birth control/protection: Vasectomy     Comment: 2012   Other Topics Concern    Caffeine Concern No    Exercise No    Seat Belt Yes    Special Diet No    Stress Concern Yes    Weight Concern Yes     Occ: working full time from home. : yes. Children: .   Exercise: minimal.  Diet: watches minimally     REVIEW OF SYSTEMS:   GENERAL: feels well otherwise  SKIN: denies any unusual skin lesions  EYES:denies blurred vision or double vision  HEENT: denies nasal congestion, sinus pain or ST  LUNGS: denies shortness of breath with exertion  CARDIOVASCULAR: denies chest pain on exertion  GI: denies abdominal pain,denies heartburn  : denies dysuria, vaginal discharge or itching,periods absent    MUSCULOSKELETAL: denies back pain or joint pain   NEURO: + headaches  PSYCHE: denies depression or anxiety  HEMATOLOGIC: denies hx of anemia  ENDOCRINE: denies thyroid history  ALL/ASTHMA: denies hx of allergy or asthma    EXAM:   BP (!) 140/100 (BP Location: Left arm, Patient Position: Sitting, Cuff Size: adult)   Pulse 83   Temp 97.3 °F (36.3 °C)   Resp 18   Ht 5' 6\" (1.676 m)   Wt 162 lb (73.5 kg)   LMP 09/28/2023 (Approximate)   SpO2 97%   BMI 26.15 kg/m²   Body mass index is 26.15 kg/m².   GENERAL: well developed, well nourished,in no apparent distress  SKIN: no rashes,no suspicious lesions  HEENT: atraumatic, normocephalic,ears and throat are clear  EYES:PERRLA, EOMI, conjunctiva are clear  NECK: supple,no adenopathy   CHEST: no chest tenderness  BREAST: not done   LUNGS: clear to auscultation  CARDIO: RRR without murmur  GI: good BS's,no masses, HSM or tenderness  : not done, following with gyne   MUSCULOSKELETAL: back is not tender,FROM of the back  EXTREMITIES: no cyanosis, clubbing or edema  NEURO: Oriented times three,cranial nerves are intact,motor and sensory are grossly intact    ASSESSMENT AND PLAN:   Claribel Mohan is a 48 year old female who presents for a  complete physical exam.  Pap and pelvic done with gyne. Not due for mammogram. Self breast exam explained. Health maintenance, will check fasting Lipids, CMP, and CBC. Pt due for screening colonoscopy, cologuard ordered.  The patient indicates understanding of these issues and agrees to the plan.  The patient is asked to return for CPX in 1 yr or sooner if needed.     Encounter Diagnoses   Name Primary?    Healthy adult on routine physical examination Yes    Screening for malignant neoplasm of colon     Primary hypertension     Elevated cholesterol     Pre-diabetes     - discussed low salt, cutting out processed and junk foods and fast food.   - start losartan. Follow up in 2-4 weeks.   - recheck lipid and A1c in 3 months. She is going to see nutritionist in the mean time.     Orders Placed This Encounter   Procedures    Comp Metabolic Panel (14)    Hemoglobin A1C       Meds & Refills for this Visit:  Requested Prescriptions     Signed Prescriptions Disp Refills    losartan 25 MG Oral Tab 30 tablet 0     Sig: Take 1 tablet (25 mg total) by mouth daily.       Imaging & Consults:  COLOGUARD COLON CANCER SCREENING (EXTERNAL)

## 2024-07-01 DIAGNOSIS — I10 PRIMARY HYPERTENSION: ICD-10-CM

## 2024-07-01 RX ORDER — LOSARTAN POTASSIUM 25 MG/1
25 TABLET ORAL DAILY
Qty: 30 TABLET | Refills: 0 | Status: SHIPPED | OUTPATIENT
Start: 2024-07-01

## 2024-07-01 NOTE — TELEPHONE ENCOUNTER
Hypertension Medications Protocol Hbviqc7507/01/2024 03:33 PM   Protocol Details CMP or BMP in past 12 months    Last BP reading less than 140/90    EGFRCR or GFRNAA > 50    In person appointment or virtual visit in the past 12 mos or appointment in next 3 mos          LOV 6/6/24     Last Refill   losartan 25 MG Oral Tab 30 tablet 0 6/6/2024        Labs 2/10/23     Future Appointments   Date Time Provider Department Center   2/7/2025 11:00 AM Lynn Osullivan DO [facility]

## 2024-07-25 ENCOUNTER — OFFICE VISIT (OUTPATIENT)
Facility: CLINIC | Age: 49
End: 2024-07-25
Payer: COMMERCIAL

## 2024-07-25 VITALS
BODY MASS INDEX: 24.53 KG/M2 | HEIGHT: 66 IN | DIASTOLIC BLOOD PRESSURE: 68 MMHG | HEART RATE: 80 BPM | SYSTOLIC BLOOD PRESSURE: 132 MMHG | WEIGHT: 152.63 LBS

## 2024-07-25 DIAGNOSIS — N93.9 ABNORMAL UTERINE BLEEDING (AUB): Primary | ICD-10-CM

## 2024-07-25 PROCEDURE — 99213 OFFICE O/P EST LOW 20 MIN: CPT | Performed by: OBSTETRICS & GYNECOLOGY

## 2024-07-27 NOTE — PROGRESS NOTES
Claribel Mohan is a 48 year old female  Patient's last menstrual period was 2023 (approximate).   Chief Complaint   Patient presents with    Gyn Problem     Patient reports currently taking active birth control  -Cramping and Spotting for 2 weeks  -Serve pain lower right side for 2 weeks       .  Patient c/o spotting for the past 2 weeks and cramping, she is taking OCP continouously  OBSTETRICS HISTORY:  OB History    Para Term  AB Living   2 2 2     3   SAB IAB Ectopic Multiple Live Births         1 3      # Outcome Date GA Lbr Nick/2nd Weight Sex Type Anes PTL Lv   2A Term 11 37w0d   M Caesarean   FABY   2B Term 2011 37w0d   M Caesarean   FABY   1 Term 09    M NORMAL SPONT   FABY       GYNE HISTORY:  Periods spotting only    History   Sexual Activity    Sexual activity: Yes    Partners: Male    Birth control/ protection: Vasectomy, OCP     Comment:         Hx Prior Abnormal Pap: No  Pap Date: 24  Pap Result Notes: Negative        MEDICAL HISTORY:  Past Medical History:    Abnormal uterine bleeding    Amenorrhea    Anxiety    Depression    Dysmenorrhea    Dyspareunia    Fibroids    Hx gestational diabetes    Hyperlipidemia    Hypertension    Migraine headache without aura    Pap smear for cervical cancer screening    negative       SURGICAL HISTORY:  Past Surgical History:   Procedure Laterality Date          Other surgical history      benign tumor removed form neck in high school       SOCIAL HISTORY:  Social History     Socioeconomic History    Marital status:      Spouse name: Not on file    Number of children: Not on file    Years of education: Not on file    Highest education level: Not on file   Occupational History    Not on file   Tobacco Use    Smoking status: Never    Smokeless tobacco: Never   Vaping Use    Vaping status: Never Used   Substance and Sexual Activity    Alcohol use: Yes     Comment: occ    Drug use: No    Sexual activity: Yes      Partners: Male     Birth control/protection: Vasectomy, OCP     Comment: 2012   Other Topics Concern    Caffeine Concern No    Exercise No    Seat Belt Yes    Special Diet No    Stress Concern Yes    Weight Concern Yes   Social History Narrative    Not on file     Social Determinants of Health     Financial Resource Strain: Not on file   Food Insecurity: Not on file   Transportation Needs: Not on file   Physical Activity: Not on file   Stress: Not on file   Social Connections: Not on file   Housing Stability: Not on file       FAMILY HISTORY:  Family History   Problem Relation Age of Onset    Diabetes Father     Heart Disorder Mother         Mitral valve prolapse    Other (perthes disease) Son     No Known Problems Son     No Known Problems Son     No Known Problems Maternal Grandmother     Heart Disease Maternal Grandfather     Heart Disorder Maternal Grandfather          of heart attack.    No Known Problems Paternal Grandmother     Heart Disease Paternal Grandfather     Heart Disorder Paternal Grandfather          of congenital heart disease.    Breast Cancer Neg     Prostate Cancer Neg     Ovarian Cancer Neg     Colon Cancer Neg     Pancreatic Cancer Neg     Uterine Cancer Neg     Infertility Neg     Endometriosis Neg        MEDICATIONS:    Current Outpatient Medications:     Cobalamin Combinations (B-12) 100-5000 MCG Sublingual SL Tab, , Disp: , Rfl:     buPROPion  MG Oral Tablet 24 Hr, Take 1 tablet (150 mg total) by mouth daily., Disp: 30 tablet, Rfl: 0    losartan 25 MG Oral Tab, Take 1 tablet (25 mg total) by mouth daily., Disp: 30 tablet, Rfl: 0    FROYLAN 3-0.03 MG Oral Tab, , Disp: , Rfl:     Eletriptan Hydrobromide 20 MG Oral Tab, TAKE 1 TABLET BY MOUTH AS NEEDED FOR MIGRAINE. MAY REPEAT DOSE AFTER 2 HOURS IF NEEDED. DO NOT EXCEED 2 DOSES IN 24 HOURS., Disp: 8 tablet, Rfl: 5    Ferrous Sulfate (IRON) 325 (65 Fe) MG Oral Tab, Take by mouth., Disp: , Rfl:     Vitamin D3 25 MCG (1000 UT)  Oral Tab, Take 1 tablet (1,000 Units total) by mouth daily., Disp: , Rfl:     Ferrous Gluconate (IRON 27 OR), As Directed., Disp: , Rfl:     Cholecalciferol (VITAMIN D) 50 MCG (2000 UT) Oral Tab, As Directed., Disp: , Rfl:     Norethindrone Acet-Ethinyl Est 1.5-30 MG-MCG Oral Tab, Take 1 tablet by mouth daily. (Patient not taking: Reported on 7/23/2024), Disp: , Rfl:     ALLERGIES:  No Known Allergies      PHYSICAL EXAM:   Pelvic Exam:  External Genitalia: normal appearance, hair distribution, and no lesions  Urethral Meatus:  normal in size, location, without lesions and prolapse  Bladder:  No fullness, masses or tenderness  Vagina:  Normal appearance without lesions, no abnormal discharge  Cervix:  Normal without tenderness on motion  Uterus: normal in size, contour, position, mobility, without tenderness  Adnexa: normal without masses or tenderness  Perineum: normal  Anus: no hemorroids     Assessment & Plan:  1. Abnormal uterine bleeding (AUB)    - Pelvic US Complete GYNE Only [[id number]]; Future  If normal findings consider stopping OCP for 4-7 days and restarting again

## 2024-08-02 ENCOUNTER — ULTRASOUND ENCOUNTER (OUTPATIENT)
Facility: CLINIC | Age: 49
End: 2024-08-02
Payer: COMMERCIAL

## 2024-08-06 ENCOUNTER — PATIENT MESSAGE (OUTPATIENT)
Facility: CLINIC | Age: 49
End: 2024-08-06

## 2024-08-07 NOTE — TELEPHONE ENCOUNTER
From: Claribel Mohan  To: Lynn Osullivan  Sent: 8/6/2024 3:30 PM CDT  Subject: Ultrasound    Good afternoon - I had the requested ultrasound done last Friday, 8/2/24. Do I need to do anything further at this time? Thank you.

## 2024-08-08 NOTE — TELEPHONE ENCOUNTER
Spoke with patient. Patient received Dr. Osullivan's message and recommendations:     Left patient message - small fibroid, inlikely causing spotting based on its location - no follow up needed, consider stopping pill for 5-7 days and restart, if spotting persists may want to change OCP  Patient verbalized understanding, agreed to and intend to comply with plan of care.

## 2024-08-18 DIAGNOSIS — F33.1 MODERATE EPISODE OF RECURRENT MAJOR DEPRESSIVE DISORDER (HCC): ICD-10-CM

## 2024-08-19 DIAGNOSIS — I10 PRIMARY HYPERTENSION: ICD-10-CM

## 2024-08-19 RX ORDER — BUPROPION HYDROCHLORIDE 150 MG/1
150 TABLET ORAL DAILY
Qty: 30 TABLET | Refills: 0 | Status: SHIPPED | OUTPATIENT
Start: 2024-08-19

## 2024-08-19 RX ORDER — LOSARTAN POTASSIUM 25 MG/1
25 TABLET ORAL DAILY
Qty: 30 TABLET | Refills: 0 | Status: SHIPPED | OUTPATIENT
Start: 2024-08-19

## 2024-08-19 RX ORDER — DROSPIRENONE AND ETHINYL ESTRADIOL 0.03MG-3MG
1 KIT ORAL DAILY
Qty: 28 TABLET | Refills: 0 | Status: SHIPPED | OUTPATIENT
Start: 2024-08-19

## 2024-08-19 NOTE — TELEPHONE ENCOUNTER
Gynecology Medication Protocol Cmacto4008/18/2024 03:08 PM    PASS--PENDING LAST PAP WNL--VIA MANUAL LOOKUP    Mammogram in past 12 months    Physical or Pelvic/Breast in past 12 or next 3 mos--VIA MANUAL LOOKUP       LOV 7/23/24     Last Refill   Medication Quantity Refills Start End   FROYLAN 3-0.03 MG Oral Tab -- -- 6/2/2024        Last Paulo 3/8/24     Future Appointments   Date Time Provider Department Center   2/7/2025 11:00 AM Lynn Osullivan DO [facility]

## 2024-08-19 NOTE — TELEPHONE ENCOUNTER
Psychiatric Non-Scheduled (Anti-Anxiety) Oheaaj9308/18/2024 10:04 PM   Protocol Details In person appointment or virtual visit in the past 6 mos or appointment in next 3 mos    Depression Screening completed within the past 12 months            LOV 7/23/24     Last Refill  Medication Quantity Refills Start End   buPROPion  MG Oral Tablet 24 Hr 30 tablet 0 7/23/2024        Future Appointments   Date Time Provider Department Center   2/7/2025 11:00 AM Lynn Osullivan DO [facility]

## 2024-08-19 NOTE — TELEPHONE ENCOUNTER
Hypertension Medications Protocol Ivevux2508/19/2024 06:06 AM   Protocol Details CMP or BMP in past 12 months    EGFRCR or GFRNAA > 50    Last BP reading less than 140/90    In person appointment or virtual visit in the past 12 mos or appointment in next 3 mos          LOV 7/23/24     Last Refill   Medication Quantity Refills Start End   losartan 25 MG Oral Tab 30 tablet 0 7/1/2024        Labs 2/10/23     Last BP   07/23/24 128/70   06/06/24 (!) 140/100   02/02/24 115/81   12/21/23 (!) 154/95   10/20/23 (!) 152/100   09/01/23 (!) 158/93       Future Appointments   Date Time Provider Department Center   2/7/2025 11:00 AM Lynn Osullivan DO [facility]

## 2024-09-15 DIAGNOSIS — F33.1 MODERATE EPISODE OF RECURRENT MAJOR DEPRESSIVE DISORDER (HCC): ICD-10-CM

## 2024-09-16 RX ORDER — BUPROPION HYDROCHLORIDE 150 MG/1
150 TABLET ORAL DAILY
Qty: 30 TABLET | Refills: 0 | Status: SHIPPED | OUTPATIENT
Start: 2024-09-16

## 2024-09-16 RX ORDER — DROSPIRENONE AND ETHINYL ESTRADIOL 0.03MG-3MG
1 KIT ORAL DAILY
Qty: 28 TABLET | Refills: 0 | Status: SHIPPED | OUTPATIENT
Start: 2024-09-16

## 2024-09-16 NOTE — TELEPHONE ENCOUNTER
Psychiatric Non-Scheduled (Anti-Anxiety) Uidntx95/15/2024 09:03 PM   Protocol Details In person appointment or virtual visit in the past 6 mos or appointment in next 3 mos    Depression Screening completed within the past 12 months        Gynecology Medication Protocol Lugdno64/15/2024 09:03 PM    PASS--PENDING LAST PAP WNL--VIA MANUAL LOOKUP    Mammogram in past 12 months    Physical or Pelvic/Breast in past 12 or next 3 mos--VIA MANUAL LOOKUP       LOV 7/23/24     Last Refill    FROYLAN 3-0.03 MG Oral Tab 28 tablet 0 8/19/2024     Medication Quantity Refills Start End   BUPROPION  MG Oral Tablet 24 Hr 30 tablet 0 8/19/2024      Labs 2/2/24     Future Appointments   Date Time Provider Department Center   2/7/2025 11:00 AM Lnyn Osullivan DO [facility]

## 2024-09-28 DIAGNOSIS — I10 PRIMARY HYPERTENSION: ICD-10-CM

## 2024-09-28 RX ORDER — LOSARTAN POTASSIUM 25 MG/1
25 TABLET ORAL DAILY
Qty: 90 TABLET | Refills: 0 | Status: SHIPPED | OUTPATIENT
Start: 2024-09-28

## 2024-09-28 NOTE — TELEPHONE ENCOUNTER
Hypertension Medications Protocol Ucgzxt1409/28/2024 10:10 AM   Protocol Details CMP or BMP in past 12 months    EGFRCR or GFRNAA > 50    Last BP reading less than 140/90    In person appointment or virtual visit in the past 12 mos or appointment in next 3 mos   Routing to provider per protocol.   LOSARTAN 25 MG Oral Tab   Last refilled on 8/19/24 for #30  with 0 rf.   Last labs 2/2/24.   Last seen on 7/23/24.       Future Appointments   Date Time Provider Department Center   2/7/2025 11:00 AM Lynn Osullivan DO [facility]          Thank you.

## 2024-10-04 ENCOUNTER — TELEPHONE (OUTPATIENT)
Dept: FAMILY MEDICINE CLINIC | Facility: CLINIC | Age: 49
End: 2024-10-04

## 2024-10-04 NOTE — TELEPHONE ENCOUNTER
Lab Frequency Next Occurrence   Comp Metabolic Panel (14) Once 09/04/2024   Hemoglobin A1C Once 09/04/2024     Letter mailed to patient reminding them they have outstanding orders.

## 2024-10-05 RX ORDER — DROSPIRENONE AND ETHINYL ESTRADIOL 0.03MG-3MG
1 KIT ORAL DAILY
Qty: 28 TABLET | Refills: 5 | Status: SHIPPED | OUTPATIENT
Start: 2024-10-05

## 2024-10-05 NOTE — TELEPHONE ENCOUNTER
DROSPIRENONE-ETHINYL 3-0.03MG T 28S   LOV 7/23/2024  Last [id number]  Last labs 2/2/2024  Last refill on 9/16/2024, for #28, with 0 refills    Future Appointments   Date Time Provider Department Center   2/7/2025 11:00 AM Lynn Osullivan DO [facility]     Gynecology Medication Protocol Pssxah62/04/2024 09:50 PM    PASS--PENDING LAST PAP WNL--VIA MANUAL LOOKUP    Mammogram in past 12 months    Physical or Pelvic/Breast in past 12 or next 3 mos--VIA MANUAL LOOKUP

## 2024-10-18 DIAGNOSIS — F33.1 MODERATE EPISODE OF RECURRENT MAJOR DEPRESSIVE DISORDER (HCC): ICD-10-CM

## 2024-10-18 RX ORDER — BUPROPION HYDROCHLORIDE 150 MG/1
150 TABLET ORAL DAILY
Qty: 30 TABLET | Refills: 0 | Status: SHIPPED | OUTPATIENT
Start: 2024-10-18

## 2024-10-18 NOTE — TELEPHONE ENCOUNTER
Psychiatric Non-Scheduled (Anti-Anxiety) Nnlzdz81/18/2024 12:49 PM   Protocol Details In person appointment or virtual visit in the past 6 mos or appointment in next 3 mos    Depression Screening completed within the past 12 months       Medication(s) to Refill:   Requested Prescriptions     Pending Prescriptions Disp Refills    BUPROPION  MG Oral Tablet 24 Hr [Pharmacy Med Name: BUPROPION XL 150MG TABLETS (24 H)] 30 tablet 0     Sig: TAKE 1 TABLET(150 MG) BY MOUTH DAILY         Reason for Medication Refill being sent to Provider / Reason Protocol Failed:  [] 90 day refill has already been granted  [] Blood Pressure out of range  [] Labs Abnormal/over due  [] Medication not previously prescribed by Provider  [x] Non-Protocol Medication  [] Controlled Substance   [] Due for appointment- no future appointment scheduled  [] No Follow up specified      Last Time Medication was Filled:    Medication Quantity Refills Start End   BUPROPION  MG Oral Tablet 24 Hr 30 tablet 0 9/16/2024          Last Office Visit with PCP: 7/23/24         Future Appointments:  Future Appointments   Date Time Provider Department Center   2/7/2025 11:00 AM Lynn Osullivan DO [facility]         Last Blood Pressures:  BP Readings from Last 2 Encounters:   07/25/24 132/68   07/23/24 128/70       Action taken:  [x] Refill approved per protocol  [] Routing to provider for approval

## 2024-11-20 DIAGNOSIS — F33.1 MODERATE EPISODE OF RECURRENT MAJOR DEPRESSIVE DISORDER (HCC): ICD-10-CM

## 2024-11-21 RX ORDER — BUPROPION HYDROCHLORIDE 150 MG/1
150 TABLET ORAL DAILY
Qty: 30 TABLET | Refills: 0 | Status: SHIPPED | OUTPATIENT
Start: 2024-11-21

## 2024-11-21 NOTE — TELEPHONE ENCOUNTER
Psychiatric Non-Scheduled (Anti-Anxiety) Lnwcjw9211/20/2024 05:25 PM   Protocol Details In person appointment or virtual visit in the past 6 mos or appointment in next 3 mos    Depression Screening completed within the past 12 months

## 2024-12-16 DIAGNOSIS — I10 PRIMARY HYPERTENSION: ICD-10-CM

## 2024-12-16 DIAGNOSIS — F33.1 MODERATE EPISODE OF RECURRENT MAJOR DEPRESSIVE DISORDER (HCC): ICD-10-CM

## 2024-12-17 RX ORDER — LOSARTAN POTASSIUM 25 MG/1
25 TABLET ORAL DAILY
Qty: 90 TABLET | Refills: 0 | Status: SHIPPED | OUTPATIENT
Start: 2024-12-17

## 2024-12-17 RX ORDER — BUPROPION HYDROCHLORIDE 150 MG/1
150 TABLET ORAL DAILY
Qty: 30 TABLET | Refills: 0 | Status: SHIPPED | OUTPATIENT
Start: 2024-12-17

## 2024-12-17 NOTE — TELEPHONE ENCOUNTER
Hypertension Medications Protocol Uvldal0612/16/2024 01:11 PM   Protocol Details CMP or BMP in past 12 months    EGFRCR or GFRNAA > 50    Last BP reading less than 140/90    In person appointment or virtual visit in the past 12 mos or appointment in next 3 mos      Routing to provider per protocol.   losartan 25 MG Oral Tab   Last refilled on 9/28/24 for #90  with 0 rf.   Last labs 2/2/24.   Last seen on 7/23/24.       Future Appointments   Date Time Provider Department Center   2/7/2025 11:00 AM Lynn Osullivan DO [facility]          Thank you.       Psychiatric Non-Scheduled (Anti-Anxiety) Otibwe5812/16/2024 01:11 PM   Protocol Details In person appointment or virtual visit in the past 6 mos or appointment in next 3 mos    Depression Screening completed within the past 12 months

## 2025-01-14 DIAGNOSIS — F33.1 MODERATE EPISODE OF RECURRENT MAJOR DEPRESSIVE DISORDER (HCC): ICD-10-CM

## 2025-01-15 RX ORDER — BUPROPION HYDROCHLORIDE 150 MG/1
150 TABLET ORAL DAILY
Qty: 30 TABLET | Refills: 0 | Status: SHIPPED | OUTPATIENT
Start: 2025-01-15

## 2025-01-15 NOTE — TELEPHONE ENCOUNTER
Psychiatric Non-Scheduled (Anti-Anxiety) Nphwpn4901/14/2025 05:19 PM   Protocol Details In person appointment or virtual visit in the past 6 mos or appointment in next 3 mos    Depression Screening completed within the past 12 months    Medication is active on med list

## 2025-02-11 DIAGNOSIS — F33.1 MODERATE EPISODE OF RECURRENT MAJOR DEPRESSIVE DISORDER (HCC): ICD-10-CM

## 2025-02-11 RX ORDER — BUPROPION HYDROCHLORIDE 150 MG/1
150 TABLET ORAL DAILY
Qty: 90 TABLET | Refills: 0 | Status: SHIPPED | OUTPATIENT
Start: 2025-02-11

## 2025-02-11 NOTE — TELEPHONE ENCOUNTER
Psychiatric Non-Scheduled (Anti-Anxiety) Zfqlru4402/11/2025 12:17 PM   Protocol Details In person appointment or virtual visit in the past 6 mos or appointment in next 3 mos    Depression Screening completed within the past 12 months    Medication is active on med list       Last refill:   BUPROPION  MG Oral Tablet 24 Hr 30 tablet 0 1/15/2025     Last Visit: 7/23/24    Next Visit:   Future Appointments   Date Time Provider Department Center   4/3/2025 10:30 AM Lynn Osullivan DO [facility]         Forward to Dr. Rangel please advise on refills. Thanks.

## 2025-02-12 ENCOUNTER — HOSPITAL ENCOUNTER (OUTPATIENT)
Age: 50
Discharge: HOME | End: 2025-02-12
Payer: COMMERCIAL

## 2025-02-12 VITALS
TEMPERATURE: 99 F | RESPIRATION RATE: 16 BRPM | SYSTOLIC BLOOD PRESSURE: 149 MMHG | HEART RATE: 87 BPM | DIASTOLIC BLOOD PRESSURE: 79 MMHG | OXYGEN SATURATION: 100 % | WEIGHT: 140 LBS | BODY MASS INDEX: 22.5 KG/M2 | HEIGHT: 66 IN

## 2025-02-12 DIAGNOSIS — H61.23 BILATERAL IMPACTED CERUMEN: Primary | ICD-10-CM

## 2025-02-12 PROCEDURE — 99203 OFFICE O/P NEW LOW 30 MIN: CPT | Performed by: NURSE PRACTITIONER

## 2025-02-13 NOTE — ED PROVIDER NOTES
Patient Seen in: [facility]      History     Chief Complaint   Patient presents with    Ear Problem Pain     Stated Complaint: ear pain    Subjective:   49-year-old female presents with complaint of left ear clogged sensation that began 3 days ago.   No recent air travel. No swimming.   Has been using ear drops.     Pt denies fever, chills, ear pain, nasal congestion, sore throat, cough, dizziness, nausea, vomiting.       The history is provided by the patient.         Objective:     Past Medical History:    Abnormal uterine bleeding    Amenorrhea    Anxiety    Depression    Dysmenorrhea    Dyspareunia    Fibroids    Hx gestational diabetes    Hyperlipidemia    Hypertension    Migraine headache without aura    Pap smear for cervical cancer screening    negative              Past Surgical History:   Procedure Laterality Date          Other surgical history      benign tumor removed form neck in high school                No pertinent social history.            Review of Systems   Constitutional:  Negative for chills and fever.   HENT:  Negative for congestion, ear discharge, ear pain, sore throat and trouble swallowing.    Respiratory:  Negative for cough.    Gastrointestinal:  Negative for diarrhea and vomiting.   Neurological:  Negative for dizziness.       Positive for stated complaint: ear pain  Other systems are as noted in HPI.  Constitutional and vital signs reviewed.      All other systems reviewed and negative except as noted above.    Physical Exam     ED Triage Vitals [25 1901]   /79   Pulse 87   Resp 16   Temp 98.5 °F (36.9 °C)   Temp src Oral   SpO2 100 %   O2 Device None (Room air)       Current Vitals:   Vital Signs  BP: 149/79  Pulse: 87  Resp: 16  Temp: 98.5 °F (36.9 °C)  Temp src: Oral    Oxygen Therapy  SpO2: 100 %  O2 Device: None (Room air)        Physical Exam  Vitals and nursing note reviewed.   Constitutional:       General: She is not in acute distress.      Appearance: Normal appearance. She is not ill-appearing.   HENT:      Head: Normocephalic.      Right Ear: Ear canal normal. There is impacted cerumen (post cerumen removal =normal TM).      Left Ear: Ear canal normal. There is impacted cerumen (post cerumen removal =normal TM).      Nose: Nose normal.      Right Sinus: No frontal sinus tenderness.      Left Sinus: No maxillary sinus tenderness or frontal sinus tenderness.      Mouth/Throat:      Mouth: Mucous membranes are moist.      Pharynx: Oropharynx is clear.   Eyes:      Conjunctiva/sclera: Conjunctivae normal.   Cardiovascular:      Rate and Rhythm: Normal rate and regular rhythm.      Heart sounds: No murmur heard.  Pulmonary:      Effort: Pulmonary effort is normal.      Breath sounds: Normal breath sounds. No wheezing, rhonchi or rales.   Musculoskeletal:      Cervical back: Normal range of motion and neck supple.   Lymphadenopathy:      Cervical: No cervical adenopathy.   Skin:     General: Skin is warm and dry.   Neurological:      Mental Status: She is alert.   Psychiatric:         Mood and Affect: Mood normal.             ED Course   Labs Reviewed - No data to display       MDM        Medical Decision Making  49-year-old female presents with complaint of left ear clogged sensation that began 3 days ago.   Differential diagnoses include cerumen impaction, AOM, ETD.  On exam patient is well-appearing with normal vitals, bilateral TMs impacted with cerumen.  Post cerumen removal revealed normal TMs.  Supportive care discussed-avoid using Q-tips.  Follow-up with PCP as needed.  Patient agreeable to plan.      Amount and/or Complexity of Data Reviewed  External Data Reviewed: notes.        Disposition and Plan     Clinical Impression:  1. Bilateral impacted cerumen         Disposition:  Discharge  2/12/2025  7:22 pm    Follow-up:  Claribel Rangel,   [address] [facility] [address]  [phone]      As needed          Medications  Prescribed:  Current Discharge Medication List              Supplementary Documentation:

## 2025-02-13 NOTE — DISCHARGE INSTRUCTIONS
Avoid use of Q tips for removing ear wax.     Debrox drops as needed for future cerumen impaction.

## 2025-02-25 DIAGNOSIS — G43.709 CHRONIC MIGRAINE WITHOUT AURA WITHOUT STATUS MIGRAINOSUS, NOT INTRACTABLE: ICD-10-CM

## 2025-02-25 RX ORDER — ELETRIPTAN HYDROBROMIDE 20 MG/1
TABLET, FILM COATED ORAL
Qty: 8 TABLET | Refills: 5 | Status: SHIPPED | OUTPATIENT
Start: 2025-02-25

## 2025-02-25 NOTE — TELEPHONE ENCOUNTER
Neurology Medications Arqiyc4002/25/2025 12:39 PM   Protocol Details In person appointment or virtual visit in the past 6 mos or appointment in next 3 mos    Medication is active on med list          Last refill:   Eletriptan Hydrobromide 20 MG Oral Tab 8 tablet 5 5/28/2024     Last Visit: 7/23/24    Next Visit:   Future Appointments   Date Time Provider Department Center   4/3/2025 10:30 AM Lynn Osullivan DO [facility]         Forward to Dr. Rangel please advise on refills. Thanks.

## 2025-02-26 NOTE — TELEPHONE ENCOUNTER
DROSPIRENONE-ETHINYL 3-0.03MG T 28S     Gynecology Medication Protocol Passed 02/25/2025 09:46 PM   Protocol Details PASS--PENDING LAST PAP WNL--VIA MANUAL LOOKUP    Mammogram in past 12 months    Medication is active on med list    Physical or Pelvic/Breast in past 12 or next 3 mos--VIA MANUAL LOOKUP      LOV 7/23/2024  Last refill on 10/5/2024, for #28, with 5 refills    Future Appointments   Date Time Provider Department Center   4/3/2025 10:30 AM Lynn Osullivan DO [facility]

## 2025-02-27 RX ORDER — DROSPIRENONE AND ETHINYL ESTRADIOL 0.03MG-3MG
1 KIT ORAL DAILY
Qty: 28 TABLET | Refills: 5 | Status: SHIPPED | OUTPATIENT
Start: 2025-02-27

## 2025-03-18 DIAGNOSIS — I10 PRIMARY HYPERTENSION: ICD-10-CM

## 2025-03-18 RX ORDER — LOSARTAN POTASSIUM 25 MG/1
25 TABLET ORAL DAILY
Qty: 90 TABLET | Refills: 0 | Status: SHIPPED | OUTPATIENT
Start: 2025-03-18

## 2025-03-18 NOTE — TELEPHONE ENCOUNTER
Hypertension Medications Protocol Ndtpwe9603/18/2025 07:19 AM   Protocol Details CMP or BMP in past 12 months    Last BP reading less than 140/90    EGFRCR or GFRNAA > 50    In person appointment or virtual visit in the past 12 mos or appointment in next 3 mos    Medication is active on med list     Last refill:   LOSARTAN 25 MG Oral Tab 90 tablet 0 12/17/2024     Labs: 2/10/23    Last Visit: 7/23/24    Next Visit:   Future Appointments   Date Time Provider Department Center   4/3/2025  2:30 PM Lynn Osullivan DO [facility]         Forward to Dr. Rangel please advise on refills. Thanks.

## 2025-05-06 DIAGNOSIS — F33.1 MODERATE EPISODE OF RECURRENT MAJOR DEPRESSIVE DISORDER (HCC): ICD-10-CM

## 2025-05-07 RX ORDER — BUPROPION HYDROCHLORIDE 150 MG/1
150 TABLET ORAL DAILY
Qty: 90 TABLET | Refills: 0 | Status: SHIPPED | OUTPATIENT
Start: 2025-05-07

## 2025-05-07 NOTE — TELEPHONE ENCOUNTER
Pt failed refill protocol for the following reasons:        Last refill: 2/11/25  Last appt: 7/23/24  Next appt:   Future Appointments   Date Time Provider Department Center   5/23/2025 12:00 PM Lynn Osullivan DO [facility]         Forward to Dr. Rangel, please advise on refills. Thank you.

## 2025-05-22 ENCOUNTER — PATIENT MESSAGE (OUTPATIENT)
Facility: CLINIC | Age: 50
End: 2025-05-22

## 2025-05-22 DIAGNOSIS — Z12.31 ENCOUNTER FOR SCREENING MAMMOGRAM FOR MALIGNANT NEOPLASM OF BREAST: Primary | ICD-10-CM

## 2025-06-06 ENCOUNTER — HOSPITAL ENCOUNTER (OUTPATIENT)
Dept: MAMMOGRAPHY | Age: 50
Discharge: HOME | End: 2025-06-06
Attending: FAMILY MEDICINE
Payer: COMMERCIAL

## 2025-06-06 DIAGNOSIS — Z12.31 ENCOUNTER FOR SCREENING MAMMOGRAM FOR MALIGNANT NEOPLASM OF BREAST: ICD-10-CM

## 2025-06-06 PROCEDURE — 77067 SCR MAMMO BI INCL CAD: CPT | Performed by: OBSTETRICS & GYNECOLOGY

## 2025-06-06 PROCEDURE — 77063 BREAST TOMOSYNTHESIS BI: CPT | Performed by: OBSTETRICS & GYNECOLOGY

## 2025-06-19 DIAGNOSIS — I10 PRIMARY HYPERTENSION: ICD-10-CM

## 2025-06-20 RX ORDER — LOSARTAN POTASSIUM 25 MG/1
25 TABLET ORAL DAILY
Qty: 90 TABLET | Refills: 0 | Status: SHIPPED | OUTPATIENT
Start: 2025-06-20

## 2025-06-20 NOTE — TELEPHONE ENCOUNTER
Hypertension Medications Protocol Gqafce4006/19/2025 09:37 PM   Protocol Details CMP or BMP in past 12 months    Last BP reading less than 140/90    EGFRCR or GFRNAA > 50    In person appointment or virtual visit in the past 12 mos or appointment in next 3 mos    Medication is active on med list          Last refill:   LOSARTAN 25 MG Oral Tab 90 tablet 0 3/18/2025     Last Visit: 7/23/24    Next Visit:   Future Appointments   Date Time Provider Department Center   7/10/2025  3:00 PM Lynn Osullivan DO [facility] [place]   7/11/2025 11:00 AM PF [facility]   7/25/2025 10:00 AM Claribel Rangel DO [facility]         Forward to Dr. Rangel please advise on refills. Thanks.

## 2025-07-10 ENCOUNTER — OFFICE VISIT (OUTPATIENT)
Facility: CLINIC | Age: 50
End: 2025-07-10
Payer: COMMERCIAL

## 2025-07-10 VITALS
DIASTOLIC BLOOD PRESSURE: 62 MMHG | HEART RATE: 83 BPM | WEIGHT: 146 LBS | HEIGHT: 66 IN | BODY MASS INDEX: 23.46 KG/M2 | SYSTOLIC BLOOD PRESSURE: 116 MMHG

## 2025-07-10 DIAGNOSIS — Z01.419 ENCOUNTER FOR WELL WOMAN EXAM WITH ROUTINE GYNECOLOGICAL EXAM: Primary | ICD-10-CM

## 2025-07-10 DIAGNOSIS — Z12.4 CERVICAL CANCER SCREENING: ICD-10-CM

## 2025-07-10 PROCEDURE — 87624 HPV HI-RISK TYP POOLED RSLT: CPT | Performed by: OBSTETRICS & GYNECOLOGY

## 2025-07-10 PROCEDURE — 99396 PREV VISIT EST AGE 40-64: CPT | Performed by: OBSTETRICS & GYNECOLOGY

## 2025-07-10 NOTE — PROGRESS NOTES
Claribel Mohan is a 49 year old female  No LMP recorded. (Menstrual status: Continuous Pill).   Chief Complaint   Patient presents with    Wellness Visit     WWE  - Preventative/Wellness form signed by patient.    .Patient has no complaints    OBSTETRICS HISTORY:  OB History    Para Term  AB Living   2 2 2   3   SAB IAB Ectopic Multiple Live Births      1 3      # Outcome Date GA Lbr Nick/2nd Weight Sex Type Anes PTL Lv   2A Term 11 37w0d   M Caesarean   FABY   2B Term 2011 37w0d   M Caesarean   FABY   1 Term 09    M NORMAL SPONT   FABY       GYNE HISTORY:  Periods irregular light    History   Sexual Activity    Sexual activity: Yes    Partners: Male    Birth control/ protection: Vasectomy, OCP     Comment:         Pap Date: 24  Pap Result Notes: Negative        MEDICAL HISTORY:  Past Medical History[1]    SURGICAL HISTORY:  Past Surgical History[2]    SOCIAL HISTORY:  Social History     Socioeconomic History    Marital status:      Spouse name: Not on file    Number of children: Not on file    Years of education: Not on file    Highest education level: Not on file   Occupational History    Not on file   Tobacco Use    Smoking status: Never    Smokeless tobacco: Never   Vaping Use    Vaping status: Never Used   Substance and Sexual Activity    Alcohol use: Yes     Comment: occ    Drug use: No    Sexual activity: Yes     Partners: Male     Birth control/protection: Vasectomy, OCP     Comment:    Other Topics Concern    Caffeine Concern No    Exercise No    Seat Belt Yes    Special Diet No    Stress Concern Yes    Weight Concern Yes   Social History Narrative    Not on file     Social Drivers of Health     Food Insecurity: Not on file   Transportation Needs: Not on file   Stress: Not on file   Housing Stability: Not on file       FAMILY HISTORY:  Family History[3]    MEDICATIONS:  Medications - Current[4]    ALLERGIES:  Allergies[5]      Review of  Systems:  Constitutional:  Denies fatigue, night sweats, hot flashes  Eyes:  denies blurred or double vision  Cardiovascular:  denies chest pain or palpitations  Respiratory:  denies shortness of breath  Gastrointestinal:  denies heartburn, abdominal pain, diarrhea or constipation  Genitourinary:  denies dysuria, incontinence, abnormal vaginal discharge, vaginal itching  Musculoskeletal:  denies back pain.  Skin/Breast:  Denies any breast pain, lumps, or discharge.   Neurological:  denies headaches, extremity weakness or numbness.  Psychiatric: denies depression or anxiety.  Endocrine:   denies excessive thirst or urination.  Heme/Lymph:  denies history of anemia, easy bruising or bleeding.      PHYSICAL EXAM:   Constitutional: well developed, well nourished  Head/Face: normocephalic  Neck/Thyroid: thyroid symmetric, no thyromegaly, no nodules, no adenopathy  Lymphatic:no abnormal supraclavicular or axillary adenopathy is noted  Breast: normal without palpable masses, tenderness, asymmetry, nipple discharge, nipple retraction or skin changes  Abdomen:  soft, nontender, nondistended, no masses  Skin/Hair: no unusual rashes or bruises  Extremities: no edema, no cyanosis  Psychiatric:  Oriented to time, place, person and situation. Appropriate mood and affect    Pelvic Exam:  External Genitalia: normal appearance, hair distribution, and no lesions  Urethral Meatus:  normal in size, location, without lesions and prolapse  Bladder:  No fullness, masses or tenderness  Vagina:  Normal appearance without lesions, no abnormal discharge  Cervix:  Normal without tenderness on motion  Uterus: normal in size, contour, position, mobility, without tenderness  Adnexa: normal without masses or tenderness  Perineum: normal  Anus: no hemorroids     Assessment & Plan:  Diagnoses and all orders for this visit:    Encounter for well woman exam with routine gynecological exam    Cervical cancer screening  -     Hpv High Risk , Thin Prep  Collect; Future  -     ThinPrep PAP Smear B; Future                   [1]   Past Medical History:   Abnormal uterine bleeding    Amenorrhea    Anxiety    Depression    Dysmenorrhea    Dyspareunia    Fibroids    Hx gestational diabetes    Hyperlipidemia    Hypertension    Migraine headache without aura    Pap smear for cervical cancer screening    negative   [2]   Past Surgical History:  Procedure Laterality Date            2009    Other surgical history      benign tumor removed form neck in high school   [3]   Family History  Problem Relation Age of Onset    Diabetes Father     Heart Disorder Mother         Mitral valve prolapse    Other (perthes disease) Son     No Known Problems Son     No Known Problems Son     No Known Problems Maternal Grandmother     Heart Disease Maternal Grandfather     Heart Disorder Maternal Grandfather          of heart attack.    Dementia Paternal Grandmother     Heart Disease Paternal Grandfather     Heart Disorder Paternal Grandfather          of congenital heart disease.    Breast Cancer Neg     Prostate Cancer Neg     Ovarian Cancer Neg     Colon Cancer Neg     Pancreatic Cancer Neg     Uterine Cancer Neg     Infertility Neg     Endometriosis Neg     Cancer Neg    [4]   Current Outpatient Medications:     LOSARTAN 25 MG Oral Tab, TAKE 1 TABLET(25 MG) BY MOUTH DAILY, Disp: 90 tablet, Rfl: 0    BUPROPION  MG Oral Tablet 24 Hr, TAKE 1 TABLET(150 MG) BY MOUTH DAILY, Disp: 90 tablet, Rfl: 0    DROSPIRENONE-ETHINYL ESTRADIOL 3-0.03 MG Oral Tab, TAKE 1 TABLET BY MOUTH DAILY, Disp: 28 tablet, Rfl: 5    Eletriptan Hydrobromide 20 MG Oral Tab, TAKE 1 TABLET BY MOUTH AT ONSET OF MIGRAINE, MAY REPEAT IN 2 HOURS AS NEEDED. DO NOT EXCEED 2 DOSES IN 24 HOURS, Disp: 8 tablet, Rfl: 5    Cobalamin Combinations (B-12) 100-5000 MCG Sublingual SL Tab, , Disp: , Rfl:     Ferrous Sulfate (IRON) 325 (65 Fe) MG Oral Tab, Take by mouth., Disp: , Rfl:     Vitamin  D3 25 MCG (1000 UT) Oral Tab, Take 1 tablet (1,000 Units total) by mouth daily., Disp: , Rfl:   [5] No Known Allergies

## 2025-07-11 ENCOUNTER — HOSPITAL ENCOUNTER (OUTPATIENT)
Dept: MAMMOGRAPHY | Age: 50
Discharge: HOME | End: 2025-07-11
Attending: OBSTETRICS & GYNECOLOGY
Payer: COMMERCIAL

## 2025-07-11 DIAGNOSIS — R92.2 INCONCLUSIVE MAMMOGRAM: ICD-10-CM

## 2025-07-11 LAB — HPV E6+E7 MRNA CVX QL NAA+PROBE: NEGATIVE

## 2025-07-11 PROCEDURE — 77061 BREAST TOMOSYNTHESIS UNI: CPT | Performed by: OBSTETRICS & GYNECOLOGY

## 2025-07-11 PROCEDURE — 77065 DX MAMMO INCL CAD UNI: CPT | Performed by: OBSTETRICS & GYNECOLOGY

## 2025-07-15 ENCOUNTER — TELEPHONE (OUTPATIENT)
Dept: FAMILY MEDICINE CLINIC | Facility: CLINIC | Age: 50
End: 2025-07-15

## 2025-07-15 RX ORDER — DROSPIRENONE AND ETHINYL ESTRADIOL 0.03MG-3MG
1 KIT ORAL DAILY
Qty: 28 TABLET | Refills: 0 | Status: SHIPPED | OUTPATIENT
Start: 2025-07-15 | End: 2025-07-21

## 2025-07-15 RX ORDER — DROSPIRENONE AND ETHINYL ESTRADIOL 0.03MG-3MG
1 KIT ORAL DAILY
Qty: 28 TABLET | Refills: 5 | OUTPATIENT
Start: 2025-07-15

## 2025-07-15 NOTE — TELEPHONE ENCOUNTER
Please disregard -     Future Appointments   Date Time Provider Department Center   7/25/2025 10:00 AM Claribel Rangel DO [facility]   7/17/2026 11:00 AM Lynn Osullivan DO [facility]

## 2025-07-15 NOTE — TELEPHONE ENCOUNTER
Refill passed per [employer] protocol.    Health Maintenance   Topic Date Due    Pap Smear  07/10/2028     Mammogram due on 07/11/2026    Last annual physical or breast/pelvic exam: patient saw Ob/Gyn 7/10/2025    Requested Prescriptions   Pending Prescriptions Disp Refills    drospirenone-ethinyl estradiol 3-0.03 MG Oral Tab 28 tablet 5     Sig: Take 1 tablet by mouth daily.       Gynecology Medication Protocol Passed - 7/15/2025  4:47 PM        Passed - PASS--PENDING LAST PAP WNL--VIA MANUAL LOOKUP        Passed - Mammogram in past 12 months        Passed - Medication is active on med list        Passed - Physical or Pelvic/Breast in past 12 or next 3 mos--VIA MANUAL LOOKUP

## 2025-07-21 RX ORDER — DROSPIRENONE AND ETHINYL ESTRADIOL 0.03MG-3MG
1 KIT ORAL DAILY
Qty: 84 TABLET | Refills: 3 | Status: SHIPPED | OUTPATIENT
Start: 2025-07-21

## 2025-07-21 NOTE — TELEPHONE ENCOUNTER
Gynecology Medication Protocol Passed 07/15/2025 04:50 PM   Protocol Details PASS--PENDING LAST PAP WNL--VIA MANUAL LOOKUP    Mammogram in past 12 months    Medication is active on med list    Physical or Pelvic/Breast in past 12 or next 3 mos--VIA MANUAL LOOKUP     Refilled per protocol

## 2025-07-25 ENCOUNTER — OFFICE VISIT (OUTPATIENT)
Dept: FAMILY MEDICINE CLINIC | Facility: CLINIC | Age: 50
End: 2025-07-25
Payer: COMMERCIAL

## 2025-07-25 VITALS
TEMPERATURE: 97 F | SYSTOLIC BLOOD PRESSURE: 122 MMHG | OXYGEN SATURATION: 99 % | HEART RATE: 79 BPM | DIASTOLIC BLOOD PRESSURE: 72 MMHG | BODY MASS INDEX: 24 KG/M2 | WEIGHT: 146.5 LBS | RESPIRATION RATE: 18 BRPM

## 2025-07-25 DIAGNOSIS — G43.709 CHRONIC MIGRAINE WITHOUT AURA WITHOUT STATUS MIGRAINOSUS, NOT INTRACTABLE: ICD-10-CM

## 2025-07-25 DIAGNOSIS — F33.1 MODERATE EPISODE OF RECURRENT MAJOR DEPRESSIVE DISORDER (HCC): ICD-10-CM

## 2025-07-25 DIAGNOSIS — Z12.11 SCREENING FOR MALIGNANT NEOPLASM OF COLON: ICD-10-CM

## 2025-07-25 DIAGNOSIS — Z00.00 HEALTHY ADULT ON ROUTINE PHYSICAL EXAMINATION: Primary | ICD-10-CM

## 2025-07-25 LAB
ALBUMIN SERPL-MCNC: 4.9 G/DL (ref 3.2–4.8)
ALBUMIN/GLOB SERPL: 1.9 {RATIO} (ref 1–2)
ALP LIVER SERPL-CCNC: 55 U/L (ref 39–100)
ALT SERPL-CCNC: 11 U/L (ref 10–49)
ANION GAP SERPL CALC-SCNC: 8 MMOL/L (ref 0–18)
AST SERPL-CCNC: 13 U/L (ref ?–34)
BASOPHILS # BLD AUTO: 0.06 X10(3) UL (ref 0–0.2)
BASOPHILS NFR BLD AUTO: 0.7 %
BILIRUB SERPL-MCNC: 0.5 MG/DL (ref 0.3–1.2)
BUN BLD-MCNC: 8 MG/DL (ref 9–23)
CALCIUM BLD-MCNC: 10 MG/DL (ref 8.7–10.6)
CHLORIDE SERPL-SCNC: 102 MMOL/L (ref 98–112)
CHOLEST SERPL-MCNC: 236 MG/DL (ref ?–200)
CO2 SERPL-SCNC: 26 MMOL/L (ref 21–32)
CREAT BLD-MCNC: 0.78 MG/DL (ref 0.55–1.02)
DEPRECATED HBV CORE AB SER IA-ACNC: 221 NG/ML (ref 50–306)
EGFRCR SERPLBLD CKD-EPI 2021: 93 ML/MIN/1.73M2 (ref 60–?)
EOSINOPHIL # BLD AUTO: 0.21 X10(3) UL (ref 0–0.7)
EOSINOPHIL NFR BLD AUTO: 2.3 %
ERYTHROCYTE [DISTWIDTH] IN BLOOD BY AUTOMATED COUNT: 12 %
FASTING PATIENT LIPID ANSWER: YES
FASTING STATUS PATIENT QL REPORTED: YES
GLOBULIN PLAS-MCNC: 2.6 G/DL (ref 2–3.5)
GLUCOSE BLD-MCNC: 96 MG/DL (ref 70–99)
HCT VFR BLD AUTO: 39.6 % (ref 35–48)
HDLC SERPL-MCNC: 66 MG/DL (ref 40–59)
HGB BLD-MCNC: 13.4 G/DL (ref 12–16)
IMM GRANULOCYTES # BLD AUTO: 0.03 X10(3) UL (ref 0–1)
IMM GRANULOCYTES NFR BLD: 0.3 %
LDLC SERPL CALC-MCNC: 135 MG/DL (ref ?–100)
LYMPHOCYTES # BLD AUTO: 1.55 X10(3) UL (ref 1–4)
LYMPHOCYTES NFR BLD AUTO: 17.1 %
MCH RBC QN AUTO: 29.1 PG (ref 26–34)
MCHC RBC AUTO-ENTMCNC: 33.8 G/DL (ref 31–37)
MCV RBC AUTO: 85.9 FL (ref 80–100)
MONOCYTES # BLD AUTO: 0.53 X10(3) UL (ref 0.1–1)
MONOCYTES NFR BLD AUTO: 5.8 %
NEUTROPHILS # BLD AUTO: 6.69 X10 (3) UL (ref 1.5–7.7)
NEUTROPHILS # BLD AUTO: 6.69 X10(3) UL (ref 1.5–7.7)
NEUTROPHILS NFR BLD AUTO: 73.8 %
NONHDLC SERPL-MCNC: 170 MG/DL (ref ?–130)
OSMOLALITY SERPL CALC.SUM OF ELEC: 280 MOSM/KG (ref 275–295)
PLATELET # BLD AUTO: 368 10(3)UL (ref 150–450)
POTASSIUM SERPL-SCNC: 4.7 MMOL/L (ref 3.5–5.1)
PROT SERPL-MCNC: 7.5 G/DL (ref 5.7–8.2)
RBC # BLD AUTO: 4.61 X10(6)UL (ref 3.8–5.3)
SODIUM SERPL-SCNC: 136 MMOL/L (ref 136–145)
TRIGL SERPL-MCNC: 199 MG/DL (ref 30–149)
TSI SER-ACNC: 2.27 UIU/ML (ref 0.55–4.78)
VLDLC SERPL CALC-MCNC: 37 MG/DL (ref 0–30)
WBC # BLD AUTO: 9.1 X10(3) UL (ref 4–11)

## 2025-07-25 PROCEDURE — 80053 COMPREHEN METABOLIC PANEL: CPT | Performed by: FAMILY MEDICINE

## 2025-07-25 PROCEDURE — 99396 PREV VISIT EST AGE 40-64: CPT | Performed by: FAMILY MEDICINE

## 2025-07-25 PROCEDURE — 80061 LIPID PANEL: CPT | Performed by: FAMILY MEDICINE

## 2025-07-25 PROCEDURE — 84443 ASSAY THYROID STIM HORMONE: CPT | Performed by: FAMILY MEDICINE

## 2025-07-25 PROCEDURE — 85025 COMPLETE CBC W/AUTO DIFF WBC: CPT | Performed by: FAMILY MEDICINE

## 2025-07-25 PROCEDURE — 82728 ASSAY OF FERRITIN: CPT | Performed by: FAMILY MEDICINE

## 2025-07-25 RX ORDER — ELETRIPTAN HYDROBROMIDE 20 MG/1
TABLET, FILM COATED ORAL
Qty: 10 TABLET | Refills: 5 | Status: SHIPPED | OUTPATIENT
Start: 2025-07-25

## 2025-07-25 RX ORDER — PROPRANOLOL HYDROCHLORIDE 60 MG/1
60 CAPSULE, EXTENDED RELEASE ORAL DAILY
Qty: 30 CAPSULE | Refills: 0 | Status: SHIPPED | OUTPATIENT
Start: 2025-07-25 | End: 2025-08-24

## 2025-07-25 RX ORDER — BUPROPION HYDROCHLORIDE 150 MG/1
150 TABLET ORAL DAILY
Qty: 90 TABLET | Refills: 3 | Status: SHIPPED | OUTPATIENT
Start: 2025-07-25

## 2025-07-25 RX ORDER — PROPRANOLOL HYDROCHLORIDE 60 MG/1
1 CAPSULE, EXTENDED RELEASE ORAL DAILY
Qty: 90 CAPSULE | Refills: 0 | OUTPATIENT
Start: 2025-07-25

## 2025-07-25 NOTE — TELEPHONE ENCOUNTER
Hypertension Medications Protocol Zvfygl1307/25/2025 10:58 AM   Protocol Details CMP or BMP in past 12 months    EGFRCR or GFRNAA > 50    Medication is active on med list    Last BP reading less than 140/90    In person appointment or virtual visit in the past 12 mos or appointment in next 3 mos       DUPLICATE Filled 7/25/25

## 2025-07-25 NOTE — PROGRESS NOTES
HPI:   Claribel Mohan is a 49 year old female who presents for a complete physical exam. Symptoms: denies discharge, itching, burning or dysuria, on continuous birth control. A few weeks ago, stopped taking the pills and got period right away, it was still heavy with cramping/painful. Following with gyne, just had pap done earlier this month.   Paulo done as well. Needed extra views, but was benign.     Patient complains of migraines. Elitriptan works. She went through all 6 tabs that the pharmacy gives her in the past 10 days. In the past 2 weeks she's had one day without a headache.   She tries advil 800 mg, ice packs, lays in the dark.    Does not seem food-related.   Wakes up her at night at times.     HTN: doing well with losartan. No side effects.     Due for colonoscopy. Never done. No symptoms or fam hx.     Immunization History   Administered Date(s) Administered    Covid-19 Vaccine Pfizer 30 mcg/0.3 ml 04/05/2021, 04/26/2021, 12/02/2021    FLULAVAL 6 months & older 0.5 ml Prefilled syringe (95283) 11/09/2017, 10/17/2020    FLUZONE 6 months and older PFS 0.5 ml ([id number]) 11/09/2018, 10/20/2023    Influenza 10/16/2019, 11/09/2022    TDAP 02/10/2023     Wt Readings from Last 6 Encounters:   07/25/25 146 lb 8 oz (66.5 kg)   07/10/25 146 lb (66.2 kg)   02/12/25 140 lb (63.5 kg)   07/25/24 152 lb 9.6 oz (69.2 kg)   07/23/24 152 lb 3.2 oz (69 kg)   06/06/24 162 lb (73.5 kg)     Body mass index is 23.65 kg/m².     Lab Results   Component Value Date    GLU 92 02/10/2023    GLU 94 11/13/2020    GLU 91 11/15/2019     Lab Results   Component Value Date    CHOLEST 231 (H) 02/10/2023    CHOLEST 222 (H) 04/09/2021    CHOLEST 255 (H) 11/13/2020     Lab Results   Component Value Date    HDL 53 02/10/2023    HDL 48 04/09/2021    HDL 56 11/13/2020     Lab Results   Component Value Date     (H) 02/10/2023     (H) 04/09/2021     (H) 11/13/2020     Lab Results   Component Value Date    AST 17 02/10/2023    AST  9 (L) 2020    AST 10 (L) 11/15/2019     Lab Results   Component Value Date    ALT 23 02/10/2023    ALT 17 2020    ALT 19 11/15/2019       Current Outpatient Medications   Medication Sig Dispense Refill    Propranolol HCl ER 60 MG Oral Capsule SR 24 Hr Take 1 capsule (60 mg total) by mouth daily. 30 capsule 0    buPROPion  MG Oral Tablet 24 Hr Take 1 tablet (150 mg total) by mouth daily. 90 tablet 3    Eletriptan Hydrobromide 20 MG Oral Tab TAKE 1 TABLET BY MOUTH AT ONSET OF MIGRAINE, MAY REPEAT IN 2 HOURS AS NEEDED. DO NOT EXCEED 2 DOSES IN 24 HOURS 10 tablet 5    drospirenone-ethinyl estradiol (FROYLAN) 3-0.03 MG Oral Tab TAKE 1 TABLET BY MOUTH EVERY DAY 84 tablet 3    LOSARTAN 25 MG Oral Tab TAKE 1 TABLET(25 MG) BY MOUTH DAILY 90 tablet 0    Cobalamin Combinations (B-12) 100-5000 MCG Sublingual SL Tab       Ferrous Sulfate (IRON) 325 (65 Fe) MG Oral Tab Take by mouth.      Vitamin D3 25 MCG (1000 UT) Oral Tab Take 1 tablet (1,000 Units total) by mouth daily.        Past Medical History:    Abnormal uterine bleeding    Amenorrhea    Anxiety    Depression    Dysmenorrhea    Dyspareunia    Fibroids    Hx gestational diabetes    Hyperlipidemia    Hypertension    Migraine headache without aura    Pap smear for cervical cancer screening    negative      Past Surgical History:   Procedure Laterality Date            2009    Other surgical history      benign tumor removed form neck in high school      Family History   Problem Relation Age of Onset    Diabetes Father     Heart Disorder Mother         Mitral valve prolapse    Other (perthes disease) Son     No Known Problems Son     No Known Problems Son     No Known Problems Maternal Grandmother     Heart Disease Maternal Grandfather     Heart Disorder Maternal Grandfather          of heart attack.    Dementia Paternal Grandmother     Heart Disease Paternal Grandfather     Heart Disorder Paternal Grandfather           of congenital heart disease.    Breast Cancer Neg     Prostate Cancer Neg     Ovarian Cancer Neg     Colon Cancer Neg     Pancreatic Cancer Neg     Uterine Cancer Neg     Infertility Neg     Endometriosis Neg     Cancer Neg       Social History:   Social History     Socioeconomic History    Marital status:    Tobacco Use    Smoking status: Never    Smokeless tobacco: Never   Vaping Use    Vaping status: Never Used   Substance and Sexual Activity    Alcohol use: Yes     Comment: occ    Drug use: No    Sexual activity: Yes     Partners: Male     Birth control/protection: Vasectomy, OCP     Comment: 2012   Other Topics Concern    Caffeine Concern No    Exercise No    Seat Belt Yes    Special Diet No    Stress Concern Yes    Weight Concern Yes     Social Drivers of Health     Food Insecurity: No Food Insecurity (7/25/2025)    NCSS - Food Insecurity     Worried About Running Out of Food in the Last Year: No     Ran Out of Food in the Last Year: No   Transportation Needs: No Transportation Needs (7/25/2025)    NCSS - Transportation     Lack of Transportation: No   Housing Stability: Not At Risk (7/25/2025)    NCSS - Housing/Utilities     Has Housing: Yes     Worried About Losing Housing: No     Unable to Get Utilities: No     Occ: full time. : yes. Children:  teenage boys.   Exercise: minimal.  Diet: watches minimally     REVIEW OF SYSTEMS:   GENERAL: feels well otherwise  SKIN: denies any unusual skin lesions  EYES:denies blurred vision or double vision  HEENT: denies nasal congestion, sinus pain or ST  LUNGS: denies shortness of breath with exertion  CARDIOVASCULAR: denies chest pain on exertion  GI: denies abdominal pain,denies heartburn  : denies dysuria, vaginal discharge or itching,periods absent, as above   MUSCULOSKELETAL: denies back pain or joint pain   NEURO: see HPI   PSYCHE: doing well with current meds.   HEMATOLOGIC: denies hx of anemia  ENDOCRINE: denies thyroid history    EXAM:   /72    Pulse 79   Temp 97.1 °F (36.2 °C) (Temporal)   Resp 18   Wt 146 lb 8 oz (66.5 kg)   SpO2 99%   BMI 23.65 kg/m²   Body mass index is 23.65 kg/m².   GENERAL: well developed, well nourished,in no apparent distress  SKIN: no rashes,no suspicious lesions  HEENT: atraumatic, normocephalic,ears and throat are clear  EYES:PERRLA, EOMI,  conjunctiva are clear  NECK: supple,no adenopathy   CHEST: no chest tenderness  LUNGS: clear to auscultation  CARDIO: RRR without murmur  GI: good BS's,no masses, HSM or tenderness  : not done, following with gyne.   MUSCULOSKELETAL: back is not tender,FROM of the back  EXTREMITIES: no cyanosis, clubbing or edema  NEURO: Oriented times three,cranial nerves are intact,motor and sensory are grossly intact    ASSESSMENT AND PLAN:   Claribel Mohan is a 49 year old female who presents for a complete physical exam.  Pap and pelvic done with gyne. Up to date with mammogram. Self breast exam explained. Health maintenance, will check fasting Lipids, CMP, and CBC. Pt referred for screening colonoscopy (pt prefers cologuard, advised to double check with insurance).  Pt' s weight is Body mass index is 23.65 kg/m²., recommended low fat diet and aerobic exercise 30 minutes three times weekly.  The patient indicates understanding of these issues and agrees to the plan.  The patient is asked to return for CPX in 1 yr or sooner if needed.     Encounter Diagnoses   Name Primary?    Healthy adult on routine physical examination Yes    Screening for malignant neoplasm of colon     Moderate episode of recurrent major depressive disorder (HCC)     Chronic migraine without aura without status migrainosus, not intractable    - continue bupropion  mg.   - start propranolol low dose for migraine prophylaxis.   - hold losartan for BP given we are starting propranolol.   - elitriptan refilled.   - follow up in 4-6 weeks.     Orders Placed This Encounter   Procedures    CBC With Differential With  Platelet    Comp Metabolic Panel (14)    Lipid Panel    TSH W Reflex To Free T4    Ferritin    VENIPUNCTURE       Meds & Refills for this Visit:  Requested Prescriptions     Signed Prescriptions Disp Refills    Propranolol HCl ER 60 MG Oral Capsule SR 24 Hr 30 capsule 0     Sig: Take 1 capsule (60 mg total) by mouth daily.    buPROPion  MG Oral Tablet 24 Hr 90 tablet 3     Sig: Take 1 tablet (150 mg total) by mouth daily.    Eletriptan Hydrobromide 20 MG Oral Tab 10 tablet 5     Sig: TAKE 1 TABLET BY MOUTH AT ONSET OF MIGRAINE, MAY REPEAT IN 2 HOURS AS NEEDED. DO NOT EXCEED 2 DOSES IN 24 HOURS       Imaging & Consults:  COLOGUARD COLON CANCER SCREENING (EXTERNAL)

## 2025-07-31 ENCOUNTER — PATIENT MESSAGE (OUTPATIENT)
Dept: FAMILY MEDICINE CLINIC | Facility: CLINIC | Age: 50
End: 2025-07-31

## 2025-08-11 RX ORDER — DROSPIRENONE AND ETHINYL ESTRADIOL 0.03MG-3MG
1 KIT ORAL DAILY
Qty: 84 TABLET | Refills: 3 | OUTPATIENT
Start: 2025-08-11

## 2025-08-18 DIAGNOSIS — G43.709 CHRONIC MIGRAINE WITHOUT AURA WITHOUT STATUS MIGRAINOSUS, NOT INTRACTABLE: ICD-10-CM

## 2025-08-19 RX ORDER — PROPRANOLOL HYDROCHLORIDE 60 MG/1
1 CAPSULE, EXTENDED RELEASE ORAL DAILY
Qty: 30 CAPSULE | Refills: 0 | Status: SHIPPED | OUTPATIENT
Start: 2025-08-19

## (undated) DIAGNOSIS — F32.89 OTHER DEPRESSION: ICD-10-CM

## (undated) NOTE — MR AVS SNAPSHOT
After Visit Summary   6/4/2020    Lyle Joaquin    MRN: [id number]           Visit Information     Date & Time  6/4/2020 10:44 PM Provider  Ale Matos PA-C Department  [facility] Dept.  Phone  [phone]      Allergies as of a treatment plan within a few hours.  ONLINE VISIT  Primary Care Providers  Treatment for mild illness or injury that does not require immediate attention VIDEO VISITS  Average cost  $35*    e-VISTS  Average cost  $35*     SAME DAY APPOINTMENTS   Available

## (undated) NOTE — LETTER
Sihlo Burn  [address] [place] [address]    2/20/2020      Dear  Shilo Edge    In order to provide the highest quality care, MIKE Ramirez uses a sophisticated computer system to track our patient's records.

## (undated) NOTE — LETTER
Oliver Chaudhry   [address] [address]           Dear Willa Gates records indicate that you have outstanding lab work and or testing that was ordered for you and has not yet been completed:  Lab Frequency Next Occurrence   CBC WITH DIFFERENTIAL WITH PLATELET Once 13/98/0614   IRON AND TIBC Once 05/19/2023      To provide you with the best possible care, please complete these orders at your earliest convenience. If you have recently completed these orders please disregard this letter. If you have any questions please call the office at [phone].      Thank you,     [facility]

## (undated) NOTE — LETTER
03/22/21          Adelina Us   [address] [address]           Dear Jesus Mejía records indicate that you have outstanding lab work and or testing that was ordered for you and has not yet been completed:  Lab Frequency Next Occur

## (undated) NOTE — LETTER
Robb Tapia   [address] [facility] [address]           Dear Salena Toledo records indicate that you have outstanding lab work and or testing that was ordered for you and has not yet been completed:  Lab Frequency Next Occurrence   CBC WITH DIFFERENTIAL WITH PLATELET Once 22/30/4558   IRON AND TIBC Once 05/19/2023      To provide you with the best possible care, please complete these orders at your earliest convenience. If you have recently completed these orders please disregard this letter. If you have any questions please call the office at [phone].      Thank you,     [facility]

## (undated) NOTE — LETTER
Yash Roland  [address] [place] [address]    2/19/2021      Dear  Yash Roland    In order to provide the highest quality care, MIKE Salazar uses a sophisticated computer system to track our patient's records.

## (undated) NOTE — MR AVS SNAPSHOT
After Visit Summary   1/21/2022    Daphne Key   MRN: [id number]           Visit Information     Date & Time  1/21/2022 11:30 AM Provider  Rafaela Vázquez DO [facility]  [address]  Dept.  Phone  [phone] 1/27/2023 11:00 AM Quinton Osullivan [facility] [place]       Imaging Scheduling Instructions     Around January 21, 2022   Imaging:   [facility] ANABELLE 2D+3D SCREENING BILAT (DRQ=[id number])    Instructions:  Your order will generate a \"Scheduling Tic